# Patient Record
Sex: MALE | Race: WHITE | NOT HISPANIC OR LATINO | Employment: FULL TIME | URBAN - METROPOLITAN AREA
[De-identification: names, ages, dates, MRNs, and addresses within clinical notes are randomized per-mention and may not be internally consistent; named-entity substitution may affect disease eponyms.]

---

## 2018-01-04 ENCOUNTER — OFFICE VISIT CONVERTED (OUTPATIENT)
Dept: FAMILY MEDICINE CLINIC | Facility: CLINIC | Age: 48
End: 2018-01-04
Attending: PHYSICIAN ASSISTANT

## 2018-02-22 ENCOUNTER — OFFICE VISIT CONVERTED (OUTPATIENT)
Dept: FAMILY MEDICINE CLINIC | Facility: CLINIC | Age: 48
End: 2018-02-22
Attending: PHYSICIAN ASSISTANT

## 2018-02-22 ENCOUNTER — CONVERSION ENCOUNTER (OUTPATIENT)
Dept: FAMILY MEDICINE CLINIC | Facility: CLINIC | Age: 48
End: 2018-02-22

## 2018-10-15 ENCOUNTER — PROCEDURE VISIT CONVERTED (OUTPATIENT)
Dept: PODIATRY | Facility: CLINIC | Age: 48
End: 2018-10-15
Attending: PODIATRIST

## 2018-10-15 ENCOUNTER — CONVERSION ENCOUNTER (OUTPATIENT)
Dept: PODIATRY | Facility: CLINIC | Age: 48
End: 2018-10-15

## 2018-12-07 ENCOUNTER — OFFICE VISIT CONVERTED (OUTPATIENT)
Dept: FAMILY MEDICINE CLINIC | Facility: CLINIC | Age: 48
End: 2018-12-07
Attending: NURSE PRACTITIONER

## 2019-03-22 ENCOUNTER — HOSPITAL ENCOUNTER (OUTPATIENT)
Dept: LAB | Facility: HOSPITAL | Age: 49
Discharge: HOME | End: 2019-03-22
Attending: PHYSICIAN ASSISTANT

## 2019-03-22 ENCOUNTER — OFFICE VISIT CONVERTED (OUTPATIENT)
Dept: FAMILY MEDICINE CLINIC | Facility: CLINIC | Age: 49
End: 2019-03-22
Attending: PHYSICIAN ASSISTANT

## 2019-03-22 LAB
25(OH)D3 SERPL-MCNC: 32.4 NG/ML (ref 30–100)
ALBUMIN SERPL-MCNC: 4.5 G/DL (ref 3.5–5)
ALBUMIN/GLOB SERPL: 1.6 {RATIO} (ref 1.4–2.6)
ALP SERPL-CCNC: 78 U/L (ref 53–128)
ALT SERPL-CCNC: 53 U/L (ref 10–40)
ANION GAP SERPL CALC-SCNC: 21 MMOL/L (ref 8–19)
APPEARANCE UR: CLEAR
ASO AB SERPL-ACNC: 133 [IU]/ML (ref 0–200)
AST SERPL-CCNC: 33 U/L (ref 15–50)
BASOPHILS # BLD AUTO: 0.06 10*3/UL (ref 0–0.2)
BASOPHILS NFR BLD AUTO: 1.1 % (ref 0–3)
BILIRUB SERPL-MCNC: 0.31 MG/DL (ref 0.2–1.3)
BILIRUB UR QL: NEGATIVE
BUN SERPL-MCNC: 16 MG/DL (ref 5–25)
BUN/CREAT SERPL: 17 {RATIO} (ref 6–20)
CALCIUM SERPL-MCNC: 9.3 MG/DL (ref 8.7–10.4)
CHLORIDE SERPL-SCNC: 105 MMOL/L (ref 99–111)
CHOLEST SERPL-MCNC: 247 MG/DL (ref 107–200)
CHOLEST/HDLC SERPL: 7.1 {RATIO} (ref 3–6)
CK SERPL-CCNC: 179 U/L (ref 57–374)
COLOR UR: YELLOW
CONV ABS IMM GRAN: 0.01 10*3/UL (ref 0–0.2)
CONV CO2: 21 MMOL/L (ref 22–32)
CONV COLLECTION SOURCE (UA): NORMAL
CONV IMMATURE GRAN: 0.2 % (ref 0–1.8)
CONV RHEUMATOID FACTOR IGM: <10 [IU]/ML (ref 0–14)
CONV TOTAL PROTEIN: 7.3 G/DL (ref 6.3–8.2)
CONV UROBILINOGEN IN URINE BY AUTOMATED TEST STRIP: 0.2 {EHRLICHU}/DL (ref 0.1–1)
CREAT UR-MCNC: 0.95 MG/DL (ref 0.7–1.2)
CRP SERPL HS-MCNC: 0.17 MG/DL (ref 0–0.5)
DEPRECATED RDW RBC AUTO: 38.5 FL (ref 35.1–43.9)
EOSINOPHIL # BLD AUTO: 0.15 10*3/UL (ref 0–0.7)
EOSINOPHIL # BLD AUTO: 2.7 % (ref 0–7)
ERYTHROCYTE [DISTWIDTH] IN BLOOD BY AUTOMATED COUNT: 12 % (ref 11.6–14.4)
ERYTHROCYTE [SEDIMENTATION RATE] IN BLOOD: 7 MM/H (ref 0–20)
EST. AVERAGE GLUCOSE BLD GHB EST-MCNC: 111 MG/DL
FERRITIN SERPL-MCNC: 420 NG/ML (ref 30–300)
GFR SERPLBLD BASED ON 1.73 SQ M-ARVRAT: >60 ML/MIN/{1.73_M2}
GLOBULIN UR ELPH-MCNC: 2.8 G/DL (ref 2–3.5)
GLUCOSE SERPL-MCNC: 103 MG/DL (ref 70–99)
GLUCOSE UR QL: NEGATIVE MG/DL
HBA1C MFR BLD: 13.4 G/DL (ref 14–18)
HBA1C MFR BLD: 5.5 % (ref 3.5–5.7)
HCT VFR BLD AUTO: 39.8 % (ref 42–52)
HDLC SERPL-MCNC: 35 MG/DL (ref 40–60)
HGB UR QL STRIP: NEGATIVE
IRON SERPL-MCNC: 79 UG/DL (ref 70–180)
KETONES UR QL STRIP: NEGATIVE MG/DL
LDLC SERPL CALC-MCNC: 133 MG/DL (ref 70–100)
LEUKOCYTE ESTERASE UR QL STRIP: NEGATIVE
LYMPHOCYTES # BLD AUTO: 2.3 10*3/UL (ref 1–5)
MCH RBC QN AUTO: 29.3 PG (ref 27–31)
MCHC RBC AUTO-ENTMCNC: 33.7 G/DL (ref 33–37)
MCV RBC AUTO: 86.9 FL (ref 80–96)
MONOCYTES # BLD AUTO: 0.46 10*3/UL (ref 0.2–1.2)
MONOCYTES NFR BLD AUTO: 8.4 % (ref 3–10)
NEUTROPHILS # BLD AUTO: 2.51 10*3/UL (ref 2–8)
NEUTROPHILS NFR BLD AUTO: 45.7 % (ref 30–85)
NITRITE UR QL STRIP: NEGATIVE
NRBC CBCN: 0 % (ref 0–0.7)
OSMOLALITY SERPL CALC.SUM OF ELEC: 297 MOSM/KG (ref 273–304)
PH UR STRIP.AUTO: 6 [PH] (ref 5–8)
PLATELET # BLD AUTO: 242 10*3/UL (ref 130–400)
PMV BLD AUTO: 11 FL (ref 9.4–12.4)
POTASSIUM SERPL-SCNC: 4.2 MMOL/L (ref 3.5–5.3)
PROT UR QL: NEGATIVE MG/DL
PSA SERPL-MCNC: 0.9 NG/ML (ref 0–4)
RBC # BLD AUTO: 4.58 10*6/UL (ref 4.7–6.1)
SODIUM SERPL-SCNC: 143 MMOL/L (ref 135–147)
SP GR UR: 1.02 (ref 1–1.03)
T4 FREE SERPL-MCNC: 0.9 NG/DL (ref 0.9–1.8)
TESTOST SERPL-MCNC: 183 NG/DL (ref 249–836)
TRANSFERRIN SERPL-MCNC: 281 MG/DL (ref 215–365)
TRIGL SERPL-MCNC: 394 MG/DL (ref 40–150)
TSH SERPL-ACNC: 1.92 M[IU]/L (ref 0.27–4.2)
URATE SERPL-MCNC: 7.5 MG/DL (ref 3.5–8.5)
VARIANT LYMPHS NFR BLD MANUAL: 41.9 % (ref 20–45)
VLDLC SERPL-MCNC: 79 MG/DL (ref 5–37)
WBC # BLD AUTO: 5.49 10*3/UL (ref 4.8–10.8)

## 2019-03-25 LAB — CONV ANTI NUCLEAR ANTIBODY WITH REFLEX: NEGATIVE

## 2019-03-29 ENCOUNTER — HOSPITAL ENCOUNTER (OUTPATIENT)
Dept: CARDIOLOGY | Facility: HOSPITAL | Age: 49
Discharge: HOME | End: 2019-03-29
Attending: PHYSICIAN ASSISTANT

## 2019-04-01 ENCOUNTER — HOSPITAL ENCOUNTER (OUTPATIENT)
Dept: OTHER | Facility: HOSPITAL | Age: 49
Discharge: HOME | End: 2019-04-01
Attending: PHYSICIAN ASSISTANT

## 2019-04-01 LAB
FOLATE SERPL-MCNC: 15.4 NG/ML (ref 4.8–20)
FSH SERPL-ACNC: 5.7 M[IU]/ML
LH SERPL-ACNC: 2.8 M[IU]/ML
VIT B12 SERPL-MCNC: 371 PG/ML (ref 211–911)

## 2019-04-02 ENCOUNTER — OFFICE VISIT CONVERTED (OUTPATIENT)
Dept: FAMILY MEDICINE CLINIC | Facility: CLINIC | Age: 49
End: 2019-04-02
Attending: PHYSICIAN ASSISTANT

## 2019-04-05 LAB — CONV HEMOCHROMATOSIS MUTATION (C282Y,H63D,565C): NORMAL

## 2019-05-16 ENCOUNTER — OFFICE VISIT CONVERTED (OUTPATIENT)
Dept: FAMILY MEDICINE CLINIC | Facility: CLINIC | Age: 49
End: 2019-05-16
Attending: PHYSICIAN ASSISTANT

## 2019-05-16 ENCOUNTER — CONVERSION ENCOUNTER (OUTPATIENT)
Dept: FAMILY MEDICINE CLINIC | Facility: CLINIC | Age: 49
End: 2019-05-16

## 2019-07-09 ENCOUNTER — HOSPITAL ENCOUNTER (OUTPATIENT)
Dept: LAB | Facility: HOSPITAL | Age: 49
Discharge: HOME | End: 2019-07-09
Attending: PHYSICIAN ASSISTANT

## 2019-07-09 LAB
ALBUMIN SERPL-MCNC: 4.8 G/DL (ref 3.5–5)
ALBUMIN/GLOB SERPL: 1.8 {RATIO} (ref 1.4–2.6)
ALP SERPL-CCNC: 80 U/L (ref 53–128)
ALT SERPL-CCNC: 82 U/L (ref 10–40)
ANION GAP SERPL CALC-SCNC: 24 MMOL/L (ref 8–19)
AST SERPL-CCNC: 45 U/L (ref 15–50)
BASOPHILS # BLD AUTO: 0.06 10*3/UL (ref 0–0.2)
BASOPHILS NFR BLD AUTO: 0.8 % (ref 0–3)
BILIRUB SERPL-MCNC: 0.44 MG/DL (ref 0.2–1.3)
BUN SERPL-MCNC: 24 MG/DL (ref 5–25)
BUN/CREAT SERPL: 26 {RATIO} (ref 6–20)
CALCIUM SERPL-MCNC: 9.5 MG/DL (ref 8.7–10.4)
CHLORIDE SERPL-SCNC: 101 MMOL/L (ref 99–111)
CHOLEST SERPL-MCNC: 168 MG/DL (ref 107–200)
CHOLEST/HDLC SERPL: 4 {RATIO} (ref 3–6)
CONV ABS IMM GRAN: 0.03 10*3/UL (ref 0–0.2)
CONV CO2: 20 MMOL/L (ref 22–32)
CONV IMMATURE GRAN: 0.4 % (ref 0–1.8)
CONV TOTAL PROTEIN: 7.5 G/DL (ref 6.3–8.2)
CREAT UR-MCNC: 0.94 MG/DL (ref 0.7–1.2)
DEPRECATED RDW RBC AUTO: 39.3 FL (ref 35.1–43.9)
EOSINOPHIL # BLD AUTO: 0.14 10*3/UL (ref 0–0.7)
EOSINOPHIL # BLD AUTO: 1.9 % (ref 0–7)
ERYTHROCYTE [DISTWIDTH] IN BLOOD BY AUTOMATED COUNT: 12.3 % (ref 11.6–14.4)
GFR SERPLBLD BASED ON 1.73 SQ M-ARVRAT: >60 ML/MIN/{1.73_M2}
GLOBULIN UR ELPH-MCNC: 2.7 G/DL (ref 2–3.5)
GLUCOSE SERPL-MCNC: 87 MG/DL (ref 70–99)
HBA1C MFR BLD: 13.9 G/DL (ref 14–18)
HCT VFR BLD AUTO: 42.1 % (ref 42–52)
HDLC SERPL-MCNC: 42 MG/DL (ref 40–60)
LDLC SERPL CALC-MCNC: 81 MG/DL (ref 70–100)
LYMPHOCYTES # BLD AUTO: 2.3 10*3/UL (ref 1–5)
MCH RBC QN AUTO: 28.9 PG (ref 27–31)
MCHC RBC AUTO-ENTMCNC: 33 G/DL (ref 33–37)
MCV RBC AUTO: 87.5 FL (ref 80–96)
MONOCYTES # BLD AUTO: 0.56 10*3/UL (ref 0.2–1.2)
MONOCYTES NFR BLD AUTO: 7.6 % (ref 3–10)
NEUTROPHILS # BLD AUTO: 4.32 10*3/UL (ref 2–8)
NEUTROPHILS NFR BLD AUTO: 58.3 % (ref 30–85)
NRBC CBCN: 0 % (ref 0–0.7)
OSMOLALITY SERPL CALC.SUM OF ELEC: 295 MOSM/KG (ref 273–304)
PLATELET # BLD AUTO: 257 10*3/UL (ref 130–400)
PMV BLD AUTO: 10.6 FL (ref 9.4–12.4)
POTASSIUM SERPL-SCNC: 3.9 MMOL/L (ref 3.5–5.3)
PSA SERPL-MCNC: 2.27 NG/ML (ref 0–4)
RBC # BLD AUTO: 4.81 10*6/UL (ref 4.7–6.1)
SODIUM SERPL-SCNC: 141 MMOL/L (ref 135–147)
TESTOST SERPL-MCNC: 608 NG/DL (ref 249–836)
TRIGL SERPL-MCNC: 227 MG/DL (ref 40–150)
VARIANT LYMPHS NFR BLD MANUAL: 31 % (ref 20–45)
VLDLC SERPL-MCNC: 45 MG/DL (ref 5–37)
WBC # BLD AUTO: 7.41 10*3/UL (ref 4.8–10.8)

## 2019-07-17 ENCOUNTER — HOSPITAL ENCOUNTER (OUTPATIENT)
Dept: OTHER | Facility: HOSPITAL | Age: 49
Discharge: HOME | End: 2019-07-17
Attending: PHYSICIAN ASSISTANT

## 2019-07-17 LAB
ALBUMIN SERPL-MCNC: 4.5 G/DL (ref 3.5–5)
ALBUMIN/GLOB SERPL: 1.9 {RATIO} (ref 1.4–2.6)
ALP SERPL-CCNC: 74 U/L (ref 53–128)
ALT SERPL-CCNC: 54 U/L (ref 10–40)
ANION GAP SERPL CALC-SCNC: 16 MMOL/L (ref 8–19)
AST SERPL-CCNC: 29 U/L (ref 15–50)
BASOPHILS # BLD AUTO: 0.05 10*3/UL (ref 0–0.2)
BASOPHILS NFR BLD AUTO: 0.8 % (ref 0–3)
BILIRUB SERPL-MCNC: 0.33 MG/DL (ref 0.2–1.3)
BUN SERPL-MCNC: 19 MG/DL (ref 5–25)
BUN/CREAT SERPL: 21 {RATIO} (ref 6–20)
CALCIUM SERPL-MCNC: 9.3 MG/DL (ref 8.7–10.4)
CHLORIDE SERPL-SCNC: 105 MMOL/L (ref 99–111)
CHOLEST SERPL-MCNC: 124 MG/DL (ref 107–200)
CHOLEST/HDLC SERPL: 3.8 {RATIO} (ref 3–6)
CONV ABS IMM GRAN: 0.01 10*3/UL (ref 0–0.2)
CONV CO2: 25 MMOL/L (ref 22–32)
CONV IMMATURE GRAN: 0.2 % (ref 0–1.8)
CONV TOTAL PROTEIN: 6.9 G/DL (ref 6.3–8.2)
CREAT UR-MCNC: 0.9 MG/DL (ref 0.7–1.2)
DEPRECATED RDW RBC AUTO: 38.3 FL (ref 35.1–43.9)
EOSINOPHIL # BLD AUTO: 0.2 10*3/UL (ref 0–0.7)
EOSINOPHIL # BLD AUTO: 3.3 % (ref 0–7)
ERYTHROCYTE [DISTWIDTH] IN BLOOD BY AUTOMATED COUNT: 12.2 % (ref 11.6–14.4)
GFR SERPLBLD BASED ON 1.73 SQ M-ARVRAT: >60 ML/MIN/{1.73_M2}
GLOBULIN UR ELPH-MCNC: 2.4 G/DL (ref 2–3.5)
GLUCOSE SERPL-MCNC: 97 MG/DL (ref 70–99)
HBA1C MFR BLD: 14.3 G/DL (ref 14–18)
HCT VFR BLD AUTO: 41.7 % (ref 42–52)
HDLC SERPL-MCNC: 33 MG/DL (ref 40–60)
LDLC SERPL CALC-MCNC: 55 MG/DL (ref 70–100)
LYMPHOCYTES # BLD AUTO: 2.41 10*3/UL (ref 1–5)
MCH RBC QN AUTO: 29.6 PG (ref 27–31)
MCHC RBC AUTO-ENTMCNC: 34.3 G/DL (ref 33–37)
MCV RBC AUTO: 86.3 FL (ref 80–96)
MONOCYTES # BLD AUTO: 0.53 10*3/UL (ref 0.2–1.2)
MONOCYTES NFR BLD AUTO: 8.7 % (ref 3–10)
NEUTROPHILS # BLD AUTO: 2.88 10*3/UL (ref 2–8)
NEUTROPHILS NFR BLD AUTO: 47.4 % (ref 30–85)
NRBC CBCN: 0 % (ref 0–0.7)
OSMOLALITY SERPL CALC.SUM OF ELEC: 296 MOSM/KG (ref 273–304)
PLATELET # BLD AUTO: 253 10*3/UL (ref 130–400)
PMV BLD AUTO: 10.7 FL (ref 9.4–12.4)
POTASSIUM SERPL-SCNC: 3.8 MMOL/L (ref 3.5–5.3)
PSA SERPL-MCNC: 1.63 NG/ML (ref 0–4)
RBC # BLD AUTO: 4.83 10*6/UL (ref 4.7–6.1)
SODIUM SERPL-SCNC: 142 MMOL/L (ref 135–147)
TESTOST SERPL-MCNC: 382 NG/DL (ref 249–836)
TRIGL SERPL-MCNC: 179 MG/DL (ref 40–150)
VARIANT LYMPHS NFR BLD MANUAL: 39.6 % (ref 20–45)
VLDLC SERPL-MCNC: 36 MG/DL (ref 5–37)
WBC # BLD AUTO: 6.08 10*3/UL (ref 4.8–10.8)

## 2019-07-26 ENCOUNTER — HOSPITAL ENCOUNTER (OUTPATIENT)
Dept: GENERAL RADIOLOGY | Facility: HOSPITAL | Age: 49
Discharge: HOME | End: 2019-07-26
Attending: PHYSICIAN ASSISTANT

## 2019-07-31 ENCOUNTER — OFFICE VISIT CONVERTED (OUTPATIENT)
Dept: UROLOGY | Facility: CLINIC | Age: 49
End: 2019-07-31
Attending: UROLOGY

## 2019-08-05 ENCOUNTER — OFFICE VISIT CONVERTED (OUTPATIENT)
Dept: FAMILY MEDICINE CLINIC | Facility: CLINIC | Age: 49
End: 2019-08-05
Attending: PHYSICIAN ASSISTANT

## 2019-08-05 ENCOUNTER — CONVERSION ENCOUNTER (OUTPATIENT)
Dept: FAMILY MEDICINE CLINIC | Facility: CLINIC | Age: 49
End: 2019-08-05

## 2019-10-23 ENCOUNTER — CONVERSION ENCOUNTER (OUTPATIENT)
Dept: FAMILY MEDICINE CLINIC | Facility: CLINIC | Age: 49
End: 2019-10-23

## 2019-10-23 ENCOUNTER — OFFICE VISIT CONVERTED (OUTPATIENT)
Dept: FAMILY MEDICINE CLINIC | Facility: CLINIC | Age: 49
End: 2019-10-23
Attending: INTERNAL MEDICINE

## 2019-11-07 ENCOUNTER — HOSPITAL ENCOUNTER (OUTPATIENT)
Dept: LAB | Facility: HOSPITAL | Age: 49
Discharge: HOME | End: 2019-11-07
Attending: PHYSICIAN ASSISTANT

## 2019-11-07 LAB
ALBUMIN SERPL-MCNC: 4.6 G/DL (ref 3.5–5)
ALBUMIN/GLOB SERPL: 1.8 {RATIO} (ref 1.4–2.6)
ALP SERPL-CCNC: 82 U/L (ref 53–128)
ALT SERPL-CCNC: 60 U/L (ref 10–40)
ANION GAP SERPL CALC-SCNC: 21 MMOL/L (ref 8–19)
AST SERPL-CCNC: 38 U/L (ref 15–50)
BASOPHILS # BLD AUTO: 0.07 10*3/UL (ref 0–0.2)
BASOPHILS NFR BLD AUTO: 0.9 % (ref 0–3)
BILIRUB SERPL-MCNC: 0.25 MG/DL (ref 0.2–1.3)
BUN SERPL-MCNC: 14 MG/DL (ref 5–25)
BUN/CREAT SERPL: 15 {RATIO} (ref 6–20)
CALCIUM SERPL-MCNC: 9.7 MG/DL (ref 8.7–10.4)
CHLORIDE SERPL-SCNC: 104 MMOL/L (ref 99–111)
CONV ABS IMM GRAN: 0.02 10*3/UL (ref 0–0.2)
CONV CO2: 21 MMOL/L (ref 22–32)
CONV IMMATURE GRAN: 0.3 % (ref 0–1.8)
CONV TOTAL PROTEIN: 7.2 G/DL (ref 6.3–8.2)
CREAT UR-MCNC: 0.94 MG/DL (ref 0.7–1.2)
DEPRECATED RDW RBC AUTO: 38.5 FL (ref 35.1–43.9)
EOSINOPHIL # BLD AUTO: 0.15 10*3/UL (ref 0–0.7)
EOSINOPHIL # BLD AUTO: 2 % (ref 0–7)
ERYTHROCYTE [DISTWIDTH] IN BLOOD BY AUTOMATED COUNT: 12.2 % (ref 11.6–14.4)
GFR SERPLBLD BASED ON 1.73 SQ M-ARVRAT: >60 ML/MIN/{1.73_M2}
GLOBULIN UR ELPH-MCNC: 2.6 G/DL (ref 2–3.5)
GLUCOSE SERPL-MCNC: 94 MG/DL (ref 70–99)
HCT VFR BLD AUTO: 43.1 % (ref 42–52)
HGB BLD-MCNC: 14.6 G/DL (ref 14–18)
LYMPHOCYTES # BLD AUTO: 2.63 10*3/UL (ref 1–5)
LYMPHOCYTES NFR BLD AUTO: 34.2 % (ref 20–45)
MCH RBC QN AUTO: 29.4 PG (ref 27–31)
MCHC RBC AUTO-ENTMCNC: 33.9 G/DL (ref 33–37)
MCV RBC AUTO: 86.9 FL (ref 80–96)
MONOCYTES # BLD AUTO: 0.58 10*3/UL (ref 0.2–1.2)
MONOCYTES NFR BLD AUTO: 7.5 % (ref 3–10)
NEUTROPHILS # BLD AUTO: 4.24 10*3/UL (ref 2–8)
NEUTROPHILS NFR BLD AUTO: 55.1 % (ref 30–85)
NRBC CBCN: 0 % (ref 0–0.7)
OSMOLALITY SERPL CALC.SUM OF ELEC: 294 MOSM/KG (ref 273–304)
PLATELET # BLD AUTO: 238 10*3/UL (ref 130–400)
PMV BLD AUTO: 11 FL (ref 9.4–12.4)
POTASSIUM SERPL-SCNC: 3.8 MMOL/L (ref 3.5–5.3)
PSA SERPL-MCNC: 1.39 NG/ML (ref 0–4)
RBC # BLD AUTO: 4.96 10*6/UL (ref 4.7–6.1)
SODIUM SERPL-SCNC: 142 MMOL/L (ref 135–147)
TESTOST SERPL-MCNC: 212 NG/DL (ref 193–740)
WBC # BLD AUTO: 7.69 10*3/UL (ref 4.8–10.8)

## 2020-02-06 ENCOUNTER — OFFICE VISIT CONVERTED (OUTPATIENT)
Dept: FAMILY MEDICINE CLINIC | Facility: CLINIC | Age: 50
End: 2020-02-06
Attending: PHYSICIAN ASSISTANT

## 2020-02-29 ENCOUNTER — HOSPITAL ENCOUNTER (OUTPATIENT)
Dept: OTHER | Facility: HOSPITAL | Age: 50
Discharge: HOME | End: 2020-02-29
Attending: PHYSICIAN ASSISTANT

## 2020-02-29 LAB — TESTOST SERPL-MCNC: 604 NG/DL (ref 193–740)

## 2020-04-16 ENCOUNTER — TELEMEDICINE CONVERTED (OUTPATIENT)
Dept: FAMILY MEDICINE CLINIC | Facility: CLINIC | Age: 50
End: 2020-04-16
Attending: PHYSICIAN ASSISTANT

## 2020-04-17 ENCOUNTER — HOSPITAL ENCOUNTER (OUTPATIENT)
Dept: URGENT CARE | Facility: CLINIC | Age: 50
Discharge: HOME | End: 2020-04-17

## 2020-05-18 ENCOUNTER — TELEMEDICINE CONVERTED (OUTPATIENT)
Dept: FAMILY MEDICINE CLINIC | Facility: CLINIC | Age: 50
End: 2020-05-18
Attending: PHYSICIAN ASSISTANT

## 2020-05-19 ENCOUNTER — HOSPITAL ENCOUNTER (OUTPATIENT)
Dept: LAB | Facility: HOSPITAL | Age: 50
Discharge: HOME | End: 2020-05-19
Attending: PHYSICIAN ASSISTANT

## 2020-05-19 LAB
25(OH)D3 SERPL-MCNC: 40.4 NG/ML (ref 30–100)
APPEARANCE UR: CLEAR
BILIRUB UR QL: ABNORMAL
COLOR UR: ABNORMAL
CONV BACTERIA: NEGATIVE
CONV COLLECTION SOURCE (UA): ABNORMAL
CONV UROBILINOGEN IN URINE BY AUTOMATED TEST STRIP: 1 {EHRLICHU}/DL (ref 0.1–1)
GLUCOSE UR QL: NEGATIVE MG/DL
HGB UR QL STRIP: NEGATIVE
KETONES UR QL STRIP: ABNORMAL MG/DL
LEUKOCYTE ESTERASE UR QL STRIP: ABNORMAL
NITRITE UR QL STRIP: NEGATIVE
PH UR STRIP.AUTO: 7 [PH] (ref 5–8)
PROT UR QL: NEGATIVE MG/DL
PSA SERPL-MCNC: 1.59 NG/ML (ref 0–4)
RBC #/AREA URNS HPF: ABNORMAL /[HPF]
SP GR UR: 1.02 (ref 1–1.03)
T4 FREE SERPL-MCNC: 1 NG/DL (ref 0.9–1.8)
TESTOST SERPL-MCNC: 479 NG/DL (ref 193–740)
TSH SERPL-ACNC: 1.03 M[IU]/L (ref 0.27–4.2)
WBC #/AREA URNS HPF: ABNORMAL /[HPF]

## 2020-05-20 LAB
AMYLASE SERPL-CCNC: 33 U/L (ref 30–110)
CHOLEST SERPL-MCNC: 127 MG/DL (ref 107–200)
CHOLEST/HDLC SERPL: 3.3 {RATIO} (ref 3–6)
GGT SERPL-CCNC: 29 U/L (ref 8–78)
HDLC SERPL-MCNC: 38 MG/DL (ref 40–60)
LDLC SERPL CALC-MCNC: 71 MG/DL (ref 70–100)
LIPASE SERPL-CCNC: 95 U/L (ref 5–51)
TRIGL SERPL-MCNC: 90 MG/DL (ref 40–150)
VLDLC SERPL-MCNC: 18 MG/DL (ref 5–37)

## 2020-06-11 ENCOUNTER — HOSPITAL ENCOUNTER (OUTPATIENT)
Dept: GENERAL RADIOLOGY | Facility: HOSPITAL | Age: 50
Discharge: HOME | End: 2020-06-11
Attending: PHYSICIAN ASSISTANT

## 2020-06-23 ENCOUNTER — CONVERSION ENCOUNTER (OUTPATIENT)
Dept: GASTROENTEROLOGY | Facility: CLINIC | Age: 50
End: 2020-06-23

## 2020-06-23 ENCOUNTER — OFFICE VISIT CONVERTED (OUTPATIENT)
Dept: GASTROENTEROLOGY | Facility: CLINIC | Age: 50
End: 2020-06-23
Attending: NURSE PRACTITIONER

## 2020-06-27 ENCOUNTER — HOSPITAL ENCOUNTER (OUTPATIENT)
Dept: OTHER | Facility: HOSPITAL | Age: 50
Discharge: HOME | End: 2020-06-27
Attending: NURSE PRACTITIONER

## 2020-06-27 LAB
ALBUMIN SERPL-MCNC: 4.5 G/DL (ref 3.5–5)
ALBUMIN/GLOB SERPL: 1.9 {RATIO} (ref 1.4–2.6)
ALP SERPL-CCNC: 73 U/L (ref 53–128)
ALT SERPL-CCNC: 37 U/L (ref 10–40)
ANION GAP SERPL CALC-SCNC: 15 MMOL/L (ref 8–19)
AST SERPL-CCNC: 23 U/L (ref 15–50)
BASOPHILS # BLD AUTO: 0.05 10*3/UL (ref 0–0.2)
BASOPHILS NFR BLD AUTO: 1 % (ref 0–3)
BILIRUB SERPL-MCNC: 0.51 MG/DL (ref 0.2–1.3)
BUN SERPL-MCNC: 12 MG/DL (ref 5–25)
BUN/CREAT SERPL: 16 {RATIO} (ref 6–20)
CALCIUM SERPL-MCNC: 9.2 MG/DL (ref 8.7–10.4)
CHLORIDE SERPL-SCNC: 106 MMOL/L (ref 99–111)
CONV ABS IMM GRAN: 0.01 10*3/UL (ref 0–0.2)
CONV CO2: 23 MMOL/L (ref 22–32)
CONV IMMATURE GRAN: 0.2 % (ref 0–1.8)
CONV TOTAL PROTEIN: 6.9 G/DL (ref 6.3–8.2)
CREAT UR-MCNC: 0.76 MG/DL (ref 0.7–1.2)
DEPRECATED RDW RBC AUTO: 39.1 FL (ref 35.1–43.9)
EOSINOPHIL # BLD AUTO: 0.14 10*3/UL (ref 0–0.7)
EOSINOPHIL # BLD AUTO: 2.8 % (ref 0–7)
ERYTHROCYTE [DISTWIDTH] IN BLOOD BY AUTOMATED COUNT: 12.4 % (ref 11.6–14.4)
FERRITIN SERPL-MCNC: 218 NG/ML (ref 30–300)
GFR SERPLBLD BASED ON 1.73 SQ M-ARVRAT: >60 ML/MIN/{1.73_M2}
GLOBULIN UR ELPH-MCNC: 2.4 G/DL (ref 2–3.5)
GLUCOSE SERPL-MCNC: 103 MG/DL (ref 70–99)
HCT VFR BLD AUTO: 43.1 % (ref 42–52)
HGB BLD-MCNC: 14.8 G/DL (ref 14–18)
INR PPP: 0.96 (ref 2–3)
IRON SATN MFR SERPL: 19 % (ref 20–55)
IRON SERPL-MCNC: 71 UG/DL (ref 70–180)
LYMPHOCYTES # BLD AUTO: 1.73 10*3/UL (ref 1–5)
LYMPHOCYTES NFR BLD AUTO: 34.2 % (ref 20–45)
MCH RBC QN AUTO: 29.7 PG (ref 27–31)
MCHC RBC AUTO-ENTMCNC: 34.3 G/DL (ref 33–37)
MCV RBC AUTO: 86.5 FL (ref 80–96)
MONOCYTES # BLD AUTO: 0.34 10*3/UL (ref 0.2–1.2)
MONOCYTES NFR BLD AUTO: 6.7 % (ref 3–10)
NEUTROPHILS # BLD AUTO: 2.79 10*3/UL (ref 2–8)
NEUTROPHILS NFR BLD AUTO: 55.1 % (ref 30–85)
NRBC CBCN: 0 % (ref 0–0.7)
OSMOLALITY SERPL CALC.SUM OF ELEC: 290 MOSM/KG (ref 273–304)
PLATELET # BLD AUTO: 215 10*3/UL (ref 130–400)
PMV BLD AUTO: 10.4 FL (ref 9.4–12.4)
POTASSIUM SERPL-SCNC: 4 MMOL/L (ref 3.5–5.3)
PROTHROMBIN TIME: 10.4 S (ref 9.4–12)
RBC # BLD AUTO: 4.98 10*6/UL (ref 4.7–6.1)
SODIUM SERPL-SCNC: 140 MMOL/L (ref 135–147)
TIBC SERPL-MCNC: 368 UG/DL (ref 245–450)
TRANSFERRIN SERPL-MCNC: 257 MG/DL (ref 215–365)
WBC # BLD AUTO: 5.06 10*3/UL (ref 4.8–10.8)

## 2020-06-28 LAB
DSDNA AB SER-ACNC: NEGATIVE [IU]/ML
ENA AB SER IA-ACNC: NEGATIVE {RATIO}

## 2020-06-29 LAB — BACTERIA SPEC AEROBE CULT: NORMAL

## 2020-06-30 LAB
CONV HEPATITIS B SURFACE AG W CONFIRMATION RE: NEGATIVE
CONV IMMUNOGLOBULIN G (IGG): 823 MG/DL (ref 603–1613)
CONV IMMUNOGLOBULIN M (IGM): 93 MG/DL (ref 20–172)
DEPRECATED MITOCHONDRIA M2 IGG SER-ACNC: <20 UNITS (ref 0–20)
HAV IGM SERPL QL IA: NEGATIVE
HBV CORE IGM SERPL QL IA: NEGATIVE
HCV AB SER DONR QL: <0.1 S/CO RATIO (ref 0–0.9)
IGA SERPL-MCNC: 151 MG/DL (ref 90–386)
PROT PATTERN SERPL IFE-IMP: NORMAL

## 2020-07-01 LAB — SMOOTH MUSCLE F-ACTIN AB IGG: 6 UNITS (ref 0–19)

## 2020-07-02 LAB
A1AT SERPL-MCNC: 131 MG/DL (ref 101–187)
CONV NASH FIBROSURE: NORMAL
PHENOTYPE: NORMAL

## 2020-07-06 LAB — ELASTASE PANC STL-MCNT: 384 UG ELAST./G

## 2020-07-31 ENCOUNTER — HOSPITAL ENCOUNTER (OUTPATIENT)
Dept: GASTROENTEROLOGY | Facility: HOSPITAL | Age: 50
Discharge: HOME | End: 2020-07-31
Attending: NURSE PRACTITIONER

## 2020-08-21 ENCOUNTER — HOSPITAL ENCOUNTER (OUTPATIENT)
Dept: PREADMISSION TESTING | Facility: HOSPITAL | Age: 50
Discharge: HOME | End: 2020-08-21
Attending: INTERNAL MEDICINE

## 2020-08-22 LAB — SARS-COV-2 RNA SPEC QL NAA+PROBE: NOT DETECTED

## 2020-08-25 ENCOUNTER — HOSPITAL ENCOUNTER (OUTPATIENT)
Dept: GASTROENTEROLOGY | Facility: HOSPITAL | Age: 50
Setting detail: HOSPITAL OUTPATIENT SURGERY
Discharge: HOME | End: 2020-08-25
Attending: INTERNAL MEDICINE

## 2020-09-08 ENCOUNTER — HOSPITAL ENCOUNTER (OUTPATIENT)
Dept: URGENT CARE | Facility: CLINIC | Age: 50
Discharge: HOME | End: 2020-09-08
Attending: EMERGENCY MEDICINE

## 2020-10-05 ENCOUNTER — PROCEDURE VISIT CONVERTED (OUTPATIENT)
Dept: GASTROENTEROLOGY | Facility: CLINIC | Age: 50
End: 2020-10-05
Attending: NURSE PRACTITIONER

## 2020-10-28 ENCOUNTER — HOSPITAL ENCOUNTER (OUTPATIENT)
Dept: LAB | Facility: HOSPITAL | Age: 50
Discharge: HOME | End: 2020-10-28
Attending: NURSE PRACTITIONER

## 2020-10-28 ENCOUNTER — OFFICE VISIT CONVERTED (OUTPATIENT)
Dept: GASTROENTEROLOGY | Facility: CLINIC | Age: 50
End: 2020-10-28
Attending: NURSE PRACTITIONER

## 2020-10-29 LAB
IRON SATN MFR SERPL: 25 % (ref 20–55)
IRON SERPL-MCNC: 95 UG/DL (ref 70–180)
TIBC SERPL-MCNC: 383 UG/DL (ref 245–450)
TRANSFERRIN SERPL-MCNC: 268 MG/DL (ref 215–365)

## 2020-11-12 ENCOUNTER — OFFICE VISIT CONVERTED (OUTPATIENT)
Dept: ORTHOPEDIC SURGERY | Facility: CLINIC | Age: 50
End: 2020-11-12
Attending: ORTHOPAEDIC SURGERY

## 2021-01-21 ENCOUNTER — OFFICE VISIT CONVERTED (OUTPATIENT)
Dept: ORTHOPEDIC SURGERY | Facility: CLINIC | Age: 51
End: 2021-01-21
Attending: ORTHOPAEDIC SURGERY

## 2021-04-01 ENCOUNTER — HOSPITAL ENCOUNTER (OUTPATIENT)
Dept: VACCINE CLINIC | Facility: HOSPITAL | Age: 51
Discharge: HOME | End: 2021-04-01
Attending: INTERNAL MEDICINE

## 2021-04-15 ENCOUNTER — OFFICE VISIT CONVERTED (OUTPATIENT)
Dept: ORTHOPEDIC SURGERY | Facility: CLINIC | Age: 51
End: 2021-04-15
Attending: ORTHOPAEDIC SURGERY

## 2021-04-23 ENCOUNTER — HOSPITAL ENCOUNTER (OUTPATIENT)
Dept: VACCINE CLINIC | Facility: HOSPITAL | Age: 51
Discharge: HOME | End: 2021-04-23
Attending: INTERNAL MEDICINE

## 2021-04-29 ENCOUNTER — HOSPITAL ENCOUNTER (OUTPATIENT)
Dept: GENERAL RADIOLOGY | Facility: HOSPITAL | Age: 51
Discharge: HOME | End: 2021-04-29
Attending: ORTHOPAEDIC SURGERY

## 2021-05-06 ENCOUNTER — OFFICE VISIT CONVERTED (OUTPATIENT)
Dept: ORTHOPEDIC SURGERY | Facility: CLINIC | Age: 51
End: 2021-05-06
Attending: ORTHOPAEDIC SURGERY

## 2021-05-10 NOTE — H&P
History and Physical      Patient Name: Charles Nina   Patient ID: [id number]   Sex: Male   YOB: 1970    Primary Care Provider: Thor Leal PA-C   Referring Provider: Thor Leal PA-C    Visit Date: June 23, 2020    Provider: DANNI Moya   Location: [facility]   Location Address: [address] [place] [address], [address]  [place], [place]  [id number]   Location Phone: [phone]          Chief Complaint  · Abdominal pain      History Of Present Illness  The patient is a 49 year old /White male who presents on referral from Thor Leal PA-C for a gastroenterology evaluation.      Admits to drinking alcohol for many years. 1 bottle of wine daily and occasional beer. Admits to a few unprofessional tattoos. Denies iv/intranasal drug use or hx of blood transfusion.     Abdominal cramping present on and off, nothing makes it better or worse. Bowel movement 1-4x daily, loose stool. Denies hematochezia, melena, or family hx of colon cancer. He has since switched to a gluten free diet and he has notice a decrease in diarrhea but not nausea. Nauseated in the mornings, without vomiting. States he has had a weight loss of 40lb in the last 2 month.     CT of the abdomen and pelvis with contrast 6/11/2020: No acute abdominal or pelvic abnormalities identified.  Stable fluid density in the right iliopsoas, which may be secondary to iliopsoas bursitis.    EGD 3/24/2016 (Dr. Gamez): Small Hiatal Hernia  Colonoscopy 3/24/2016: 2 small 3 to 4 mm sessile polyps in the ascending colon which were removed with a cold snare in 1 piece.  Duodenum biopsyno significant pathologic change.  Ascending colon polyp nascent serrated polyp.       Past Medical History  Allergy; Asthma; Carpal tunnel syndrome of right wrist; Foot pain, left; Hyperlipidemia; Left elbow pain; Low testosterone in male; Lumbago/low back pain; Narcolepsy; Neuroma; Plantar fascial fibromatosis of right foot; Radiculopathy,  "lumbosacral; Reflux Disease; Seasonal allergies; Sinus trouble         Past Surgical History  Arm Surgery; Carpal Tunnel Release; Colonoscopy, screening; EGD; Jaw Surgery         Medication List  Allegra Allergy 180 mg oral tablet; atorvastatin 20 mg oral tablet; Depo-Testosterone 200 mg/mL intramuscular oil; needle (disp) 18 G 18 gauge x 1\" miscellaneous needle; Nexium 20 mg oral capsule,delayed release(DR/EC); Syringe 3cc/58Mp0-5/2\" 3 mL 21 gauge x 1 1/2\" miscellaneous syringe         Allergy List  Neurontin       Allergies Reconciled  Family Medical History  Diabetes, unspecified type; Diabetes, unspecified type; Rheumatoid arthritis; Prostate cancer         Social History  Active but no formal exercise; Alcohol (Light); ; No known infection risk; Tobacco (Former)         Immunizations  Name Date Admin   Tdap          Review of Systems  · Constitutional  o Denies  o : chills, fever  · Eyes  o Denies  o : blurred vision, changes in vision  · Cardiovascular  o Denies  o : chest pain, syncope  · Respiratory  o Denies  o : shortness of breath, dry cough  · Gastrointestinal  o Admits  o : See HPI  · Genitourinary  o Denies  o : dysuria, blood in urine  · Integument  o Denies  o : rash, new skin lesions  · Neurologic  o Denies  o : altered mental status, tingling or numbness  · Musculoskeletal  o Denies  o : joint pain, limitation of motion  · Endocrine  o Admits  o : weight loss  o Denies  o : weight gain  · Psychiatric  o Denies  o : anxiety, depression      Vitals  Date Time BP Position Site L\R Cuff Size HR RR TEMP (F) WT  HT  BMI kg/m2 BSA m2 O2 Sat        06/23/2020 11:22 /86 Sitting      98.5 228lbs 8oz 6'   30.99 2.29           Physical Examination  · Constitutional  o Appearance  o : well developed, well-nourished, in no acute distress  · Eyes  o Vision  o :   § Visual Fields  § : eyes move symmetrical in all directions  o Sclerae  o : anicteric  o Pupils and Irises  o : pupils equal and " symmetrical  · Neck  o Inspection/Palpation  o : supple  · Respiratory  o Respiratory Effort  o : breathing unlabored  o Inspection of Chest  o : normal appearance, no retractions  o Auscultation of Lungs  o : clear to auscultation bilaterally  · Cardiovascular  o Heart  o :   § Auscultation of Heart  § : no murmurs, gallops or rubs  · Gastrointestinal  o Abdominal Examination  o : diffuse abdominal tenderness to palpation present, normal bowel sounds, tone normal without rigidity or guarding, no masses present, abdomen scaphoid upon supine  o Digital Rectal Exam  o : deferred  · Lymphatic  o Neck  o : no palpable lymphadenopathy  · Skin and Subcutaneous Tissue  o General Inspection  o : without focal lesions; turgor is normal  · Psychiatric  o General  o : Alert and oriented x3  o Mood and Affect  o : Mood and affect are appropriate to circumstances              Assessment  · Generalized abdominal pain     789.07/R10.84  · Diarrhea     787.91/R19.7  · Fatty liver     571.8/K76.0  · Alcohol abuse, daily use     305.01/F10.10    Problems Reconciled  Plan  · Orders  o C difficile Toxigenic Assay (PCR) [facility] (09344) - - 06/23/2020  o Stool culture and sensitivity (86438) - - 06/23/2020  o Giardia and Cryptosporidium Enzyme Immunoassay [facility] (02536, 64167) - - 06/23/2020  o Lactoferrin (Fecal) Qualitative (40882) - - 06/23/2020  o Pancreatic elastase stool (83135) - - 06/23/2020  o CBC with Auto Diff [facility] (10589) - - 06/23/2020  o CMP [facility] (76179) - - 06/23/2020  o Fibroscan Liver Elastography (38432) - - 06/23/2020  o Iron + transferrin (TIBC, calculated % saturation) (17501) - - 06/23/2020  o Ferritin ser/plas (20531) - - 06/23/2020  o SUMAN (antinuclear antibody profile) by enzyme immunoassay (65230) - - 06/23/2020  o Anti-mitochondrial antibody assay (32195) - - 06/23/2020  o Anti-Smooth Muscle Antibody (ASMA) [facility] (86296) - - 06/23/2020  o Serum immunofixation electrophoresis (49600) - -  06/23/2020  o Alpha-1-Antitrypsin/Phenotype [facility] (35399, 77585) - - 06/23/2020  o PT (83479) - - 06/23/2020  o Acute hepatitis panel (HAV IgM, HbcAb IgM, HbsAg, HCV) (17618, 53458, 09042, 60197, 88108) - - 06/23/2020  o DAVIES Fibrosure [facility] (drawn at [facility] only and must be fasting 8 hours; requires HT and WT) (63471, 35609, 43526, 36403, 45700, 75264, 34020, 46061, 66354, 97976) - - 06/23/2020  · Medications  o Medications have been Reconciled  o Transition of Care or Provider Policy  · Instructions  o Information given on current diagnoses.  o Lifestyle modifications discussed.  o Electronically Identified Patient Education Materials Provided Electronically  · Disposition  o 3 month f/u            Electronically Signed by: DANNI Moya -Author on June 23, 2020 12:58:02 PM

## 2021-05-10 NOTE — PROCEDURES
Procedure Note      Patient Name: Charles Nina   Patient ID: [id number]   Sex: Male   YOB: 1970    Primary Care Provider: Thor Leal PA-C   Referring Provider: Thor Leal PA-C    Visit Date: October 5, 2020    Provider: DANNI Mohan   Location: [facility]   Location Address: [address] [place] [address], [address] [place] [address]  [id number]   Location Phone: [phone]          Charles Nina presents today for Capsule Endoscopy Procedure for anemia. He successfully swallowed capsule without difficulty or complications.           Assessment  · H/O endoscopy     V45.89/Z98.890  · Anemia due to blood loss     280.0/D50.0      Plan  · Orders  o Capsule endoscopy esophagus through ileum [facility] (35145) - - 10/05/2020  · Instructions  o Please see Capsule endoscopy report scanned into patient record.             Electronically Signed by: Sheree Parnell, -Author on October 5, 2020 08:37:19 AM

## 2021-05-10 NOTE — H&P
"   History and Physical      Patient Name: Charles Nina   Patient ID: [id number]   Sex: Male   YOB: 1970    Primary Care Provider: Tohr Leal PA-C   Referring Provider: Thor Leal PA-C    Visit Date: November 12, 2020    Provider: Teri Gaffney MD   Location: [facility]   Location Address: [address] [place] [address]  [id number]   Location Phone: [phone]          Chief Complaint  · Right Shoulder Pain      History Of Present Illness  Charles Nina is a 50 year old /White male who presents today to [facility]. Patient is here for right shoulder pain that has been bothering him for greater than a year. Patient states at the anterior portion of the shoulder, radiates down to his right elbow. Patient states has increasing pain with work, as he works at [employer]. Patient had a MRI on 6/11/2020.       Past Medical History  Allergy; Asthma; Carpal tunnel syndrome of right wrist; Foot pain, left; Hyperlipidemia; Left elbow pain; Low testosterone in male; Lumbago/low back pain; Narcolepsy; Neuroma; Plantar fascial fibromatosis of right foot; Radiculopathy, lumbosacral; Reflux Disease; Seasonal allergies; Sinus trouble         Past Surgical History  Arm Surgery; Carpal Tunnel Release; Colonoscopy, screening; EGD; Jaw Surgery         Medication List  Allegra Allergy 180 mg oral tablet; atorvastatin 20 mg oral tablet; Depo-Testosterone 200 mg/mL intramuscular oil; needle (disp) 18 G 18 gauge x 1\" miscellaneous needle; Protonix 40 mg oral tablet,delayed release (DR/EC); Syringe 3cc/86Wn2-4/2\" 3 mL 21 gauge x 1 1/2\" miscellaneous syringe; Voltaren 1 % topical gel         Allergy List  Neurontin         Family Medical History  Diabetes, unspecified type; Diabetes, unspecified type; Rheumatoid arthritis; Prostate Cancer         Social History  Active but no formal exercise; Alcohol (Light); ; No known infection risk; Tobacco (Former)         Review of " Systems  · Constitutional  o Denies  o : fever, chills, weight loss  · Cardiovascular  o Denies  o : chest pain, shortness of breath  · Gastrointestinal  o Denies  o : liver disease, heartburn, nausea, blood in stools  · Genitourinary  o Denies  o : painful urination, blood in urine  · Integument  o Denies  o : rash, itching  · Neurologic  o Denies  o : headache, weakness, loss of consciousness  · Musculoskeletal  o Denies  o : painful, swollen joints  · Psychiatric  o Denies  o : drug/alcohol addiction, anxiety, depression      Vitals  Date Time BP Position Site L\R Cuff Size HR RR TEMP (F) WT  HT  BMI kg/m2 BSA m2 O2 Sat FR L/min FiO2        11/12/2020 09:35 AM      64 - R   205lbs 16oz 6'   27.94 2.18 97 %            Physical Examination  · Constitutional  o Appearance  o : well developed, well-nourished, no obvious deformities present  · Head and Face  o Head  o :   § Inspection  § : normocephalic  o Face  o :   § Inspection  § : no facial lesions  · Eyes  o Conjunctivae  o : conjunctivae normal  o Sclerae  o : sclerae white  · Ears, Nose, Mouth and Throat  o Ears  o :   § External Ears  § : appearance within normal limits  § Hearing  § : intact  o Nose  o :   § External Nose  § : appearance normal  · Neck  o Inspection/Palpation  o : normal appearance  o Range of Motion  o : full range of motion  · Respiratory  o Respiratory Effort  o : breathing unlabored  o Inspection of Chest  o : normal appearance  o Auscultation of Lungs  o : no audible wheezing or rales  · Cardiovascular  o Heart  o : regular rate  · Gastrointestinal  o Abdominal Examination  o : soft and non-tender  · Skin and Subcutaneous Tissue  o General Inspection  o : intact, no rashes  · Psychiatric  o General  o : Alert and oriented x3  o Judgement and Insight  o : judgment and insight intact  o Mood and Affect  o : mood normal, affect appropriate  · Right Shoulder  o Inspection  o : No skin discoloration, atrophy or swelling. Palpable  tenderness over the AC joint. Palpable tenderness over the subacromial bursa. He has full range of motion. Pain with empty can. Pain with cross body adduction. Pain with internal rotation to L4. Neurovascularly and sensation grossly intact. X-rays show AC joint osteoarthritis, osteophyte formation.  · Injection Note/Aspiration Note  o Site  o : right shoulder  o Procedure  o : Procedure: After educating the patient, patient gave consent for procedure. After using Chloraprep, the joint space was injected. The patient tolerated the procedure well.   o Medication  o : 80 mg of DepoMedrol with 9cc of 1% Lidocaine  · In Office Procedures  o View  o : AP/LATERAL  o Site  o : right, scapula  o Indication  o : Right Shoulder Pain  o Study  o : X-rays ordered, taken in the office, and reviewed today.  o Xray  o : X-rays show AC joint osteoarthritis, osteophyte formation.  o Comparative Data  o : No comparative data found              Assessment  · Primary osteoarthritis of right AC joint.     715.11/M19.019  · Right shoulder bursitis     727.3/M71.9  · Right shoulder pain, unspecified chronicity     719.41/M25.511  · Right rotator cuff tendinitis     726.90/M77.9  · Shoulder impingement syndrome, right     726.2/M75.41      Plan  · Orders  o Depo-Medrol injection 80mg () - - 11/12/2020   Lot [id number] Exp 09 2021 [employer] Administered by EVA Gaffney MD  o Shoulder Intra-articular Injection without US Guidance [facility] (33111) - - 11/12/2020   Lot [id number] Exp 08 01 2021 [employer] Administered by EVA Gaffney MD  o Scapula (Right) [facility] Preferred View (79886-YW) - 719.41/M25.511 - 11/12/2020  · Medications  o Medications have been Reconciled  o Transition of Care or Provider Policy  · Instructions  o Reviewed the patient's Past Medical, Social, and Family history as well as the ROS at today's visit, no changes.  o Call or return if worsening symptoms.  o This note is transcribed by Gay alves/fiona  o We discussed  conservative versus surgical management. We discussed risks and benefits of surgery.             Electronically Signed by: Gay Rojas, -Author on November 13, 2020 10:31:01 AM  Electronically Co-signed by: Guadalupe Aquino PA-C -Reviewer on November 13, 2020 01:15:55 PM  Electronically Co-signed by: Teri Gaffney MD -Reviewer on November 14, 2020 09:59:58 AM

## 2021-05-12 ENCOUNTER — HOSPITAL ENCOUNTER (OUTPATIENT)
Dept: PERIOP | Facility: HOSPITAL | Age: 51
Setting detail: HOSPITAL OUTPATIENT SURGERY
Discharge: HOME | End: 2021-05-12
Attending: ORTHOPAEDIC SURGERY

## 2021-05-12 NOTE — PROGRESS NOTES
Progress Note      Patient Name: Charles Nina   Patient ID: [id number]   Sex: Male   YOB: 1970    Primary Care Provider: Thor Leal PA-C   Referring Provider: Thor Leal PA-C    Visit Date: April 16, 2020    Provider: Thor Leal PA-C   Location: [facility]   Location Address: [address] [place] [address], [address]  [place], [place]  [id number]   Location Phone: [phone]          Chief Complaint  · anxiety      History Of Present Illness  Video Conferencing Visit  Charles Nina is a 49 year old /White male who is presenting for evaluation via video conferencing. Verbal consent obtained before beginning visit.   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 He states he needs something to help him.  He scored a 5 on his depression today and states it is all from the COVID 19 and work.    Pt is wanting a note for work.    He has a PMH of asthma    Pt works in a factory.           Past Medical History  Disease Name Date Onset Notes   Allergy --  --    Asthma --  --    Carpal tunnel syndrome of right wrist 02/27/2015 --    Foot pain, left --  --    Hyperlipidemia 04/02/2019 --    Left elbow pain --  --    Low testosterone in male 05/16/2019 --    Lumbago/low back pain --  --    Narcolepsy 02/27/2015 --    Neuroma 10/15/2018 --    Plantar fascial fibromatosis of right foot 10/15/2018 --    Radiculopathy, lumbosacral 06/17/2016 --    Reflux Disease --  --    Seasonal allergies 02/27/2015 --    Sinus trouble --  --          Past Surgical History  Procedure Name Date Notes   Arm Surgery 1991 --    Carpal Tunnel Release --  --    Colonoscopy, screening 03/24/2016  "EGD/Colon: Hiatal Hernia/Colon Polyps (Dr. Gamez)   EGD 03/24/2016 Hiatal Hernia (Dr. Gamez)   Jaw Surgery 1991 --          Medication List  Name Date Started Instructions   Allegra Allergy 180 mg oral tablet  take 1 tablet (180 mg) by oral route once daily   atorvastatin 20 mg oral tablet 02/06/2020 TAKE 1 TABLET(20 MG) BY MOUTH EVERY DAY   Depo-Testosterone 200 mg/mL intramuscular oil 04/13/2020 inject 1 milliliter by intramuscular route every 2 weeks for 90 days   needle (disp) 18 G 18 gauge x 1\" miscellaneous needle 05/16/2019 use as directed   Nexium 20 mg oral capsule,delayed release(/EC)  take 1 capsule by oral route QD (otc)   Syringe 3cc/73Mh6-9/2\" 3 mL 21 gauge x 1 1/2\" miscellaneous syringe 05/16/2019 use as directed         Allergy List  Allergen Name Date Reaction Notes   Neurontin --  nausea --        Allergies Reconciled  Family Medical History  Disease Name Relative/Age Notes   Diabetes, unspecified type Father/   grandparents   Diabetes, unspecified type  --    Rheumatoid arthritis  --    Prostate cancer Uncle/   --          Social History  Finding Status Start/Stop Quantity Notes   Active but no formal exercise --  --/-- --  --    Alcohol Light --/-- --  02/22/2018 - 01/04/2018 -     --  --/-- --  --    No known infection risk --  --/-- --  --    Tobacco Former 17/38 2-3 ppd --          Immunizations  NameDate Admin Mfg Trade Name Lot Number Route Inj VIS Given VIS Publication   Tdap09/12/2016 [employer] [id number] IM RD 09/12/2016 01/24/2012   Comments: tolerated well         Review of Systems  · Constitutional  o Denies  o : fever, fatigue, weight loss, weight gain  · Cardiovascular  o Denies  o : pedal edema, claudication, chest pressure, palpitations  · Respiratory  o Denies  o : shortness of breath, wheezing, cough, hemoptysis, dyspnea on exertion  · Gastrointestinal  o Denies  o : nausea, vomiting, diarrhea, constipation, abdominal pain      Physical " Examination  · Constitutional  o Appearance  o : well-nourished, no acute distress  · Respiratory  o Respiratory Effort  o : breathing comfortably, no cough  · Skin and Subcutaneous Tissue  o General Inspection  o : no visible rash, no lesions  · Neurologic  o Mental Status Examination  o :   § Orientation  § : grossly oriented to person, place and time, no facial droop          Assessment  · Anxiety disorder     300.00/F41.9  · Asthma     493.90/J45.909  · Screening for depression     V79.0/Z13.89    Problems Reconciled  Plan  · Orders  o ACO-18: Positive screen for clinical depression using a standardized tool and a follow-up plan documented () - V79.0/Z13.89 - 04/16/2020  o ACO-39: Current medications updated and reviewed () - - 04/16/2020  · Medications  o Medications have been Reconciled  o Transition of Care or Provider Policy  · Instructions  o Depression Screen completed and scanned into the EMR under the designated folder within the patient's documents.  o Today's PHQ-9 result is __5_  o Take all medications as prescribed/directed.  o Patient was educated/instructed on their diagnosis, treatment and medications prior to discharge from the clinic today.  o Bring all medicines with their bottles to each office visit.  o Discussed Covid-19 precautions including, but not limited to, social distancing, avoid touching your face, and hand washing.   o Electronically Identified Patient Education Materials Provided Electronically  · Disposition  o Call or Return if symptoms worsen or persist.  o F/U in clinic in 1 month.  o Care Transition            Electronically Signed by: Thor Leal PA-C -Author on April 16, 2020 05:18:49 PM

## 2021-05-13 NOTE — PROGRESS NOTES
Progress Note      Patient Name: Charles Nina   Patient ID: [id number]   Sex: Male   YOB: 1970    Primary Care Provider: Thor Leal PA-C   Referring Provider: Thor Leal PA-C    Visit Date: May 18, 2020    Provider: Thor Leal PA-C   Location: [facility]   Location Address: [address] [place] [address], [address]  [place], [place]  [id number]   Location Phone: [phone]          Chief Complaint  · Abdominal Pain  · Diarrhea      History Of Present Illness  Video Conferencing Visit  Charles Nina is a 49 year old /White male who is presenting for evaluation via video conferencing. Verbal consent obtained before beginning visit.   The following staff were present during this visit: VASILE Cisneros   Charles Nina is a 49 year old /White male who presents for evaluation and treatment of:      abdominal pain and diarrhea    Symptoms existent for about 2 weeks    Intermittent diarrhea (at least once daily).  No dark stools/blood noticed    Associated with diffuse abdominal pain and nausea    Denies fever or vomiting    No changes noted in medications or diet.    Pt has been drinking on a regular basis.  He drinks a bottle of wine a day, this past Saturday night he drank 6-8 beers and a bottle of wine.  Pt does not think it is an issue.  He has been drinking since 18 yo.  He has been in rehab in the past.  He had a DUI at 19.      Pt is having right shoulder pain, he states that it has not improved and wanting MRI of shoulder ordered also.    Weight loss unexplained.           Past Medical History  Disease Name Date Onset Notes   Allergy --  --    Asthma --  --    Carpal tunnel syndrome of right wrist 02/27/2015 --    Foot pain, left --  --    Hyperlipidemia 04/02/2019 --    Left elbow pain --  --    Low testosterone in male 05/16/2019 --    Lumbago/low back pain --  --    Narcolepsy 02/27/2015 --    Neuroma 10/15/2018 --    Plantar fascial fibromatosis of right foot 10/15/2018  "--    Radiculopathy, lumbosacral 06/17/2016 --    Reflux Disease --  --    Seasonal allergies 02/27/2015 --    Sinus trouble --  --          Past Surgical History  Procedure Name Date Notes   Arm Surgery 1991 --    Carpal Tunnel Release --  --    Colonoscopy, screening 03/24/2016 EGD/Colon: Hiatal Hernia/Colon Polyps (Dr. Gamez)   EGD 03/24/2016 Hiatal Hernia (Dr. Gamez)   Jaw Surgery 1991 --          Medication List  Name Date Started Instructions   Allegra Allergy 180 mg oral tablet  take 1 tablet (180 mg) by oral route once daily   atorvastatin 20 mg oral tablet 02/06/2020 TAKE 1 TABLET(20 MG) BY MOUTH EVERY DAY   Depo-Testosterone 200 mg/mL intramuscular oil 04/13/2020 inject 1 milliliter by intramuscular route every 2 weeks for 90 days   needle (disp) 18 G 18 gauge x 1\" miscellaneous needle 05/16/2019 use as directed   Nexium 20 mg oral capsule,delayed release(/EC)  take 1 capsule by oral route QD (otc)   Syringe 3cc/58Xz0-9/2\" 3 mL 21 gauge x 1 1/2\" miscellaneous syringe 05/16/2019 use as directed         Allergy List  Allergen Name Date Reaction Notes   Neurontin --  nausea --          Family Medical History  Disease Name Relative/Age Notes   Diabetes, unspecified type Father/   grandparents   Diabetes, unspecified type  --    Rheumatoid arthritis  --    Prostate cancer Uncle/   --          Social History  Finding Status Start/Stop Quantity Notes   Active but no formal exercise --  --/-- --  --    Alcohol Light --/-- --  02/22/2018 - 01/04/2018 -     --  --/-- --  --    No known infection risk --  --/-- --  --    Tobacco Former 17/38 2-3 ppd --          Immunizations  NameDate Admin Mfg Trade Name Lot Number Route Inj VIS Given VIS Publication   Tdap09/12/2016 SK[employer]RI[employer] [id number] IM RD 09/12/2016 01/24/2012   Comments: tolerated well         Review of Systems  · Constitutional  o Admits  o : weight loss  o Denies  o : fever, fatigue, weight gain  · Cardiovascular  o Denies  o : lower extremity " edema, claudication, chest pressure, palpitations  · Respiratory  o Denies  o : shortness of breath, wheezing, cough, hemoptysis, dyspnea on exertion  · Gastrointestinal  o Admits  o : diarrhea  o Denies  o : nausea, vomiting, constipation, abdominal pain      Physical Examination  · Constitutional  o Appearance  o : well-nourished, no acute distress  · Respiratory  o Respiratory Effort  o : breathing comfortably, no cough  · Skin and Subcutaneous Tissue  o General Inspection  o : no visible rash, no lesions  · Neurologic  o Mental Status Examination  o :   § Orientation  § : grossly oriented to person, place and time, no facial droop          Assessment  · Abdominal pain     789.00/R10.9  · Alcohol abuse     305.00/F10.10  · Diarrhea     787.91/R19.7  · Nausea     787.02/R11.0  · Right shoulder pain     719.41/M25.511      Plan  · Orders  o Abdomen and Pelvis (CT) (with contrast) (36834) - 789.00/R10.9 - 05/19/2020  o Brief face-to-face behavioral counseling for alcohol misuse, 15 minutes () - 305.00/F10.10 - 05/19/2020  o CBC with Auto Diff [facility] (57375) - - 05/18/2020  o CMP [facility] (25294) - - 05/18/2020  o Lipid Panel [facility] (61464) - - 05/18/2020  o Thyroid Profile (43694, 45795, THYII) - - 05/18/2020  o Urinalysis with Reflex Microscopy if abnormal ([facility]) (10347) - - 05/18/2020  o Vitamin D (25-Hydroxy) Level (71053) - - 05/18/2020  o ACO-39: Current medications updated and reviewed () - - 05/18/2020  o GGT (72617) - - 05/18/2020  o Testosterone (Total) (52213) - - 05/18/2020  o PSA ultrasensitive DIAGNOSTIC [facility] (42223) - - 05/18/2020  o Amylase/Lipase Profile (ALPN) - - 05/18/2020  o MRI shoulder right wo contrast (87149) - - 05/18/2020  · Medications  o Medications have been Reconciled  o Transition of Care or Provider Policy  · Instructions  o Counseled patient on harms of alcohol misuse and encouraged cessation of alcohol intake (15 minutes).  o Local AA (Alcoholics Anonymous) information provided to  patient/family.   o Take all medications as prescribed/directed.  o Patient instructed/educated on their diet and exercise program.  o Patient was educated/instructed on their diagnosis, treatment and medications prior to discharge from the clinic today.  o Patient counseled to reduce calorie intake.  o Patient was instructed to exercise regularly.  o Discussed Covid-19 precautions including, but not limited to, social distancing, avoid touching your face, and hand washing.   · Disposition  o Call or Return if symptoms worsen or persist.  o F/U in 3 months.  o Care Transition            Electronically Signed by: Thor Leal PA-C -Author on May 19, 2020 06:51:19 AM

## 2021-05-13 NOTE — PROGRESS NOTES
Progress Note      Patient Name: Charles Nina   Patient ID: [id number]   Sex: Male   YOB: 1970    Primary Care Provider: Thor Leal PA-C   Referring Provider: Thor Leal PA-C    Visit Date: October 28, 2020    Provider: DANNI Moya   Location: [facility]   Location Address: [address] [place] [address], [address]  [place], [place]  [id number]   Location Phone: [phone]          Chief Complaint  · Follow up of EGD/Colonoscopy      History Of Present Illness     Liver work-up consistent with stage III steatosis and stage II fibrosis. Negative for any autoimmune or infectious disease. Patient states he is still drinking alcohol several times weekly, mainly wine.  Denies any ascites, confusion, or extremity swelling.    He feels that heartburn is controlled with Nexium 20 mg daily however complaining of nausea that waxes and wanes all day without vomiting.  Feels the nausea can be debilitating at times and he has missed several days of work.  Decreased appetite with a 30+ pound weight loss in the last year.  Denies early satiety.    Bowel movement 2-3x daily, formed stool. Denies any hematochezia or melena.     Capsule endoscopy 10/5/2020: Possible gastric polyp.  Petechiae in the duodenum.    EGD 8/25/2020: Hiatal hernia.  Normal mucosa in the whole esophagus.  3 to 5 mm polyp in the fundus and stomach body.  Normal mucosa in the duodenum and whole stomach.  Colonoscopy 8/25/2020: Normal mucosa in the terminal ileum.  5 mm polyp in the sigmoid colon.  Sigmoid colon polyp biopsyhyperplastic polyp.  Duodenum biopsyno significant pathologic change.  Antrum biopsyfocal intestinal metaplasia.  Gastric polyp biopsyfundic gland polyp.  Repeat colonoscopy in 5 years    CT of the abdomen and pelvis with contrast 6/11/2020: No acute abdominal or pelvic abnormalities identified.  Stable fluid density in the right iliopsoas, which may be secondary to iliopsoas bursitis.      "      Past Medical History  Allergy; Asthma; Carpal tunnel syndrome of right wrist; Foot pain, left; Hyperlipidemia; Left elbow pain; Low testosterone in male; Lumbago/low back pain; Narcolepsy; Neuroma; Plantar fascial fibromatosis of right foot; Radiculopathy, lumbosacral; Reflux Disease; Seasonal allergies; Sinus trouble         Past Surgical History  Arm Surgery; Carpal Tunnel Release; Colonoscopy, screening; EGD; Jaw Surgery         Medication List  Allegra Allergy 180 mg oral tablet; atorvastatin 20 mg oral tablet; Depo-Testosterone 200 mg/mL intramuscular oil; needle (disp) 18 G 18 gauge x 1\" miscellaneous needle; Syringe 3cc/71Lx5-7/2\" 3 mL 21 gauge x 1 1/2\" miscellaneous syringe         Allergy List  Neurontin       Allergies Reconciled  Family Medical History  Diabetes, unspecified type; Diabetes, unspecified type; Rheumatoid arthritis; Prostate cancer         Social History  Active but no formal exercise; Alcohol (Light); ; No known infection risk; Tobacco (Former)         Immunizations  Name Date Admin   Tdap 09/12/2016         Review of Systems  · Constitutional  o Denies  o : chills, fever  · Cardiovascular  o Denies  o : chest pain, dyspnea on exertion  · Respiratory  o Denies  o : cough, shortness of breath  · Gastrointestinal  o Admits  o : see HPI   · Endocrine  o Admits  o : weight loss  o Denies  o : weight gain      Vitals  Date Time BP Position Site L\R Cuff Size HR RR TEMP (F) WT  HT  BMI kg/m2 BSA m2 O2 Sat FR L/min FiO2 HC       10/28/2020 02:41 /59 Sitting    68 - R   205lbs 16oz 6'   27.94 2.18             Physical Examination  · Constitutional  o Appearance  o : Healthy-appearing, awake and alert in no acute distress  · Head and Face  o Head  o : Normocephalic with no worriesome skin lesions  · Eyes  o Vision  o :   § Visual Fields  § : eyes move symmetrical in all directions  o Sclerae  o : sclerae anicteric  o Pupils and Irises  o : pupils equal and " symmetrical  · Neck  o Inspection/Palpation  o : Trachea is midline, no adenopathy  · Respiratory  o Respiratory Effort  o : Breathing is unlabored.  o Inspection of Chest  o : normal appearance  o Auscultation of Lungs  o : Chest is clear to auscultation bilaterally.  · Cardiovascular  o Heart  o :   § Auscultation of Heart  § : no murmurs, rubs, or gallops  o Peripheral Vascular System  o :   § Extremities  § : no cyanosis, clubbing or edema;   · Gastrointestinal  o Abdominal Examination  o : left-upper quadrant tenderness to palpation present, normal bowel sounds, tone normal without rigidity or guarding, no masses present, abdomen scaphoid upon supine  o Digital Rectal Exam  o : deferred  · Skin and Subcutaneous Tissue  o General Inspection  o : without focal lesions; turgor is normal  · Psychiatric  o General  o : Alert and oriented x3  o Mood and Affect  o : Mood and affect are appropriate to circumstances          Assessment  · Fatty liver     571.8/K76.0  · Gastritis, Other specified     535.40  · Hernia, Hiatal     553.3/K44.9  · Liver fibrosis     571.5/K74.0  · Nausea     787.02/R11.0  · Iron deficiency     280.9/E61.1  · Colon polyp     211.3/K63.5      Plan  · Orders  o Iron Profile (Iron 16168 TIBC 40811 and Transferrin 72447) (IRONP) - - 10/28/2020  · Medications  o Protonix 40 mg oral tablet,delayed release (DR/EC)   SIG: take 1 tablet (40 mg) by oral route once daily for 30 days   DISP: (30) Tablet with 4 refills  Prescribed on 10/28/2020     o Nexium 20 mg oral capsule,delayed release(DR/EC)   SIG: take 1 capsule by oral route QD (otc)   DISP: (0) capsule with 0 refills  Discontinued on 10/28/2020     o Medications have been Reconciled  o Transition of Care or Provider Policy  · Instructions  o Information given on current diagnoses.  o Lifestyle modifications discussed.  o Discussed risks of long-term PPI use.  o Electronically Identified Patient Education Materials Provided  Electronically  · Disposition  o 5 month f/u            Electronically Signed by: DANNI Moya -Author on October 28, 2020 03:34:19 PM

## 2021-05-14 VITALS — OXYGEN SATURATION: 98 % | HEART RATE: 68 BPM | WEIGHT: 213 LBS | BODY MASS INDEX: 28.85 KG/M2 | HEIGHT: 72 IN

## 2021-05-14 VITALS
SYSTOLIC BLOOD PRESSURE: 124 MMHG | HEART RATE: 68 BPM | DIASTOLIC BLOOD PRESSURE: 59 MMHG | BODY MASS INDEX: 27.9 KG/M2 | WEIGHT: 206 LBS | HEIGHT: 72 IN

## 2021-05-14 VITALS — HEART RATE: 64 BPM | OXYGEN SATURATION: 97 % | WEIGHT: 206 LBS | HEIGHT: 72 IN | BODY MASS INDEX: 27.9 KG/M2

## 2021-05-14 VITALS — WEIGHT: 208.12 LBS | OXYGEN SATURATION: 97 % | HEIGHT: 72 IN | BODY MASS INDEX: 28.19 KG/M2 | HEART RATE: 74 BPM

## 2021-05-14 NOTE — PROGRESS NOTES
"   Progress Note      Patient Name: Charles Nina   Patient ID: [id number]   Sex: Male   YOB: 1970    Primary Care Provider: Thor Leal PA-C   Referring Provider: Thor Leal PA-C    Visit Date: April 15, 2021    Provider: Teri Gaffney MD   Location: [facility]   Location Address: [address] [place] [address]  [id number]   Location Phone: [phone]          Chief Complaint  · Right Shoulder Pain      History Of Present Illness  Charles Nina is a 50 year old /White male who presents today to [facility]. Patient is here for evaluation of his right shoulder. He has made some mild improvements with shots in the past. It is getting to the point where he thinks he is going to need surgery on it. He has been having increasing pain lately.       Past Medical History  Allergy; Asthma; Carpal tunnel syndrome of right wrist; Foot pain, left; Hyperlipemia; Hyperlipidemia; Left elbow pain; Low testosterone in male; Lumbago/low back pain; Narcolepsy; Neuroma; Plantar fascial fibromatosis of right foot; Radiculopathy, lumbosacral; Reflux Disease; Seasonal allergies; Sinus trouble         Past Surgical History  Arm Surgery; Carpal Tunnel Release; Colonoscopy; Colonoscopy, screening; EGD; Jaw Surgery; Metal implants         Medication List  Allegra Allergy 180 mg oral tablet; Depo-Testosterone 200 mg/mL intramuscular oil; needle (disp) 18 G 18 gauge x 1\" miscellaneous needle; Syringe 3cc/36Mj6-8/2\" 3 mL 21 gauge x 1 1/2\" miscellaneous syringe; Voltaren 1 % topical gel         Allergy List  Neurontin         Family Medical History  Diabetes, unspecified type; Diabetes, unspecified type; Rheumatoid arthritis; Prostate cancer; Family history of Arthritis         Social History  Active but no formal exercise; Alcohol (Light); Alcohol Use (Never); lives with children; lives with spouse; ; .; No known infection risk; Recreational Drug Use (Former); Tobacco (Former); " Working         Review of Systems  · Constitutional  o Denies  o : fever, chills, weight loss  · Cardiovascular  o Denies  o : chest pain, shortness of breath  · Gastrointestinal  o Denies  o : liver disease, heartburn, nausea, blood in stools  · Genitourinary  o Denies  o : painful urination, blood in urine  · Integument  o Denies  o : rash, itching  · Neurologic  o Denies  o : headache, weakness, loss of consciousness  · Musculoskeletal  o Admits  o : painful, swollen joints  · Psychiatric  o Denies  o : drug/alcohol addiction, anxiety, depression      Vitals  Date Time BP Position Site L\R Cuff Size HR RR TEMP (F) WT  HT  BMI kg/m2 BSA m2 O2 Sat FR L/min FiO2 HC       04/15/2021 03:58 PM      68 - R   212lbs 16oz 6'   28.89 2.22 98 %            Physical Examination  · Constitutional  o Appearance  o : well developed, well-nourished, no obvious deformities present  · Head and Face  o Head  o :   § Inspection  § : normocephalic  o Face  o :   § Inspection  § : no facial lesions  · Eyes  o Conjunctivae  o : conjunctivae normal  o Sclerae  o : sclerae white  · Ears, Nose, Mouth and Throat  o Ears  o :   § External Ears  § : appearance within normal limits  § Hearing  § : intact  o Nose  o :   § External Nose  § : appearance normal  · Neck  o Inspection/Palpation  o : normal appearance  o Range of Motion  o : full range of motion  · Respiratory  o Respiratory Effort  o : breathing unlabored  o Inspection of Chest  o : normal appearance  o Auscultation of Lungs  o : no audible wheezing or rales  · Cardiovascular  o Heart  o : regular rate  · Gastrointestinal  o Abdominal Examination  o : soft and non-tender  · Skin and Subcutaneous Tissue  o General Inspection  o : intact, no rashes  · Psychiatric  o General  o : Alert and oriented x3  o Judgement and Insight  o : judgment and insight intact  o Mood and Affect  o : mood normal, affect appropriate  · Right Shoulder  o Inspection  o : Mild weakness. Sensation intact.  Pulse is normal. He has decreased range of motion. Positive impingement test.           Assessment  · Right shoulder pain, unspecified chronicity     719.41/M25.511  · Shoulder pain, right     719.41/M25.511      Plan  · Medications  o Medications have been Reconciled  o Transition of Care or Provider Policy  · Instructions  o Reviewed the patient's Past Medical, Social, and Family history as well as the ROS at today's visit, no changes.  o Call or return if worsening symptoms.  o This note is transcribed by Gay Rojas /fiona  o He needs a MRI to check his rotator cuff before we plan surgery. See him back after the MRI for the results.             Electronically Signed by: Gay Rojas, -Author on April 21, 2021 09:01:15 AM  Electronically Co-signed by: Teri Gaffney MD -Reviewer on April 21, 2021 09:59:23 PM

## 2021-05-14 NOTE — PROGRESS NOTES
"   Progress Note      Patient Name: Charles Nina   Patient ID: [id number]   Sex: Male   YOB: 1970    Primary Care Provider: Thor Leal PA-C   Referring Provider: Thor Leal PA-C    Visit Date: January 21, 2021    Provider: Teri Gaffney MD   Location: [facility]   Location Address: [address] [place] [address]  [id number]   Location Phone: [phone]          Chief Complaint  · Right Shoulder Pain      History Of Present Illness  Charles Nina is a 50 year old /White male who presents today to [facility]. Patient is following-up for right shoulder pain. Patient states increase of pain. Patient states steroid injections did provide relief of pain for 6 weeks. Patient states increase of pain in the anterior portion of her shoulder, radiates down to his right elbow and into his shoulder blade. Patient denies any recent injury or trauma.       Past Medical History  Allergy; Asthma; Carpal tunnel syndrome of right wrist; Foot pain, left; Hyperlipemia; Hyperlipidemia; Left elbow pain; Low testosterone in male; Lumbago/low back pain; Narcolepsy; Neuroma; Plantar fascial fibromatosis of right foot; Radiculopathy, lumbosacral; Reflux Disease; Seasonal allergies; Sinus trouble         Past Surgical History  Arm Surgery; Carpal Tunnel Release; Colonoscopy; Colonoscopy, screening; EGD; Jaw Surgery; Metal implants         Medication List  Allegra Allergy 180 mg oral tablet; Depo-Testosterone 200 mg/mL intramuscular oil; needle (disp) 18 G 18 gauge x 1\" miscellaneous needle; Syringe 3cc/38Sl4-9/2\" 3 mL 21 gauge x 1 1/2\" miscellaneous syringe; Voltaren 1 % topical gel         Allergy List  Neurontin         Family Medical History  Diabetes, unspecified type; Diabetes, unspecified type; Rheumatoid arthritis; Prostate cancer; Family history of Arthritis         Social History  Active but no formal exercise; Alcohol (Light); Alcohol Use (Never); lives with children; lives with " spouse; ; .; No known infection risk; Recreational Drug Use (Former); Tobacco (Former); Working         Review of Systems  · Constitutional  o Denies  o : fever, chills, weight loss  · Cardiovascular  o Denies  o : chest pain, shortness of breath  · Gastrointestinal  o Denies  o : liver disease, heartburn, nausea, blood in stools  · Genitourinary  o Denies  o : painful urination, blood in urine  · Integument  o Denies  o : rash, itching  · Neurologic  o Denies  o : headache, weakness, loss of consciousness  · Musculoskeletal  o Admits  o : painful, swollen joints  · Psychiatric  o Denies  o : drug/alcohol addiction, anxiety, depression      Vitals  Date Time BP Position Site L\R Cuff Size HR RR TEMP (F) WT  HT  BMI kg/m2 BSA m2 O2 Sat FR L/min FiO2 HC       01/21/2021 01:49 PM      74 - R   208lbs 2oz 6'   28.23 2.19 97 %            Physical Examination  · Constitutional  o Appearance  o : well developed, well-nourished, no obvious deformities present  · Head and Face  o Head  o :   § Inspection  § : normocephalic  o Face  o :   § Inspection  § : no facial lesions  · Eyes  o Conjunctivae  o : conjunctivae normal  o Sclerae  o : sclerae white  · Ears, Nose, Mouth and Throat  o Ears  o :   § External Ears  § : appearance within normal limits  § Hearing  § : intact  o Nose  o :   § External Nose  § : appearance normal  · Neck  o Inspection/Palpation  o : normal appearance  o Range of Motion  o : full range of motion  · Respiratory  o Respiratory Effort  o : breathing unlabored  o Inspection of Chest  o : normal appearance  o Auscultation of Lungs  o : no audible wheezing or rales  · Cardiovascular  o Heart  o : regular rate  · Gastrointestinal  o Abdominal Examination  o : soft and non-tender  · Skin and Subcutaneous Tissue  o General Inspection  o : intact, no rashes  · Psychiatric  o General  o : Alert and oriented x3  o Judgement and Insight  o : judgment and insight intact  o Mood and Affect  o : mood  normal, affect appropriate  · Right Shoulder  o Inspection  o : No skin discoloration, atrophy or swelling. Palpable tenderness over the AC joint. Palpable tenderness over the subacromial bursa. He has full range of motion. Pain with internal rotation. Pain with cross-body adduction. Pain with Neers. Pain with Arreola. Neurovascularly and sensation grossly intact.   · Injection Note/Aspiration Note  o Site  o : right shoulder  o Procedure  o : Procedure: After educating the patient, patient gave consent for procedure. After using Chloraprep, the joint space was injected. The patient tolerated the procedure well.   o Medication  o : 80 mg of DepoMedrol with 9cc of 1% Lidocaine          Assessment  · Primary osteoarthritis of right shoulder     715.11/M19.019  · Right shoulder pain, unspecified chronicity     719.41/M25.511  · Right shoulder impingement     726.2/M75.40      Plan  · Orders  o Depo-Medrol injection 80mg () - - 01/21/2021   Lot [id number] Exp 01 2022 [employer] Administered by EVA Gaffney MD  o Shoulder Intra-articular Injection without US Guidance [facility] (90895) - - 01/21/2021   Lot [id number] Exp 03 01 2022 [employer] Administered by EVA Gaffney MD  · Medications  o Medications have been Reconciled  o Transition of Care or Provider Policy  · Instructions  o Reviewed the patient's Past Medical, Social, and Family history as well as the ROS at today's visit, no changes.  o Call or return if worsening symptoms.  o This note is transcribed by Gay alves/fiona  o Steroid injection. Follow-up as needed.             Electronically Signed by: Gay Rojas, -Author on January 22, 2021 11:29:49 AM  Electronically Co-signed by: Guadalupe Aquino PA-C -Reviewer on January 22, 2021 12:27:57 PM  Electronically Co-signed by: Teri Gaffney MD -Reviewer on January 22, 2021 06:43:38 PM

## 2021-05-15 VITALS
OXYGEN SATURATION: 98 % | SYSTOLIC BLOOD PRESSURE: 127 MMHG | HEART RATE: 83 BPM | DIASTOLIC BLOOD PRESSURE: 77 MMHG | BODY MASS INDEX: 34.55 KG/M2 | WEIGHT: 255.12 LBS | HEIGHT: 72 IN

## 2021-05-15 VITALS
OXYGEN SATURATION: 97 % | HEIGHT: 72 IN | SYSTOLIC BLOOD PRESSURE: 122 MMHG | WEIGHT: 257.25 LBS | HEART RATE: 62 BPM | DIASTOLIC BLOOD PRESSURE: 74 MMHG | BODY MASS INDEX: 34.84 KG/M2

## 2021-05-15 VITALS
BODY MASS INDEX: 34.72 KG/M2 | DIASTOLIC BLOOD PRESSURE: 71 MMHG | SYSTOLIC BLOOD PRESSURE: 133 MMHG | HEART RATE: 61 BPM | HEIGHT: 72 IN | WEIGHT: 256.37 LBS | OXYGEN SATURATION: 97 %

## 2021-05-15 VITALS
SYSTOLIC BLOOD PRESSURE: 118 MMHG | HEIGHT: 72 IN | DIASTOLIC BLOOD PRESSURE: 72 MMHG | OXYGEN SATURATION: 95 % | HEART RATE: 62 BPM | WEIGHT: 247.25 LBS | BODY MASS INDEX: 33.49 KG/M2

## 2021-05-15 VITALS
HEIGHT: 72 IN | SYSTOLIC BLOOD PRESSURE: 112 MMHG | TEMPERATURE: 98.5 F | WEIGHT: 228.5 LBS | DIASTOLIC BLOOD PRESSURE: 86 MMHG | BODY MASS INDEX: 30.95 KG/M2

## 2021-05-15 VITALS — WEIGHT: 253 LBS | HEIGHT: 72 IN | BODY MASS INDEX: 34.27 KG/M2 | RESPIRATION RATE: 15 BRPM

## 2021-05-15 VITALS
BODY MASS INDEX: 35.13 KG/M2 | DIASTOLIC BLOOD PRESSURE: 64 MMHG | HEART RATE: 63 BPM | SYSTOLIC BLOOD PRESSURE: 123 MMHG | WEIGHT: 259.37 LBS | OXYGEN SATURATION: 98 % | HEIGHT: 72 IN

## 2021-05-15 VITALS
WEIGHT: 248.25 LBS | HEIGHT: 72 IN | DIASTOLIC BLOOD PRESSURE: 72 MMHG | OXYGEN SATURATION: 100 % | TEMPERATURE: 98.3 F | SYSTOLIC BLOOD PRESSURE: 117 MMHG | HEART RATE: 65 BPM | BODY MASS INDEX: 33.62 KG/M2

## 2021-05-16 VITALS
BODY MASS INDEX: 33.76 KG/M2 | TEMPERATURE: 97.1 F | HEIGHT: 72 IN | SYSTOLIC BLOOD PRESSURE: 126 MMHG | DIASTOLIC BLOOD PRESSURE: 77 MMHG | WEIGHT: 249.25 LBS | OXYGEN SATURATION: 99 % | HEART RATE: 70 BPM

## 2021-05-16 VITALS
OXYGEN SATURATION: 97 % | WEIGHT: 232 LBS | SYSTOLIC BLOOD PRESSURE: 114 MMHG | DIASTOLIC BLOOD PRESSURE: 57 MMHG | HEIGHT: 72 IN | BODY MASS INDEX: 31.42 KG/M2 | HEART RATE: 72 BPM

## 2021-05-16 VITALS
DIASTOLIC BLOOD PRESSURE: 75 MMHG | SYSTOLIC BLOOD PRESSURE: 137 MMHG | HEART RATE: 69 BPM | BODY MASS INDEX: 32.51 KG/M2 | WEIGHT: 240 LBS | HEIGHT: 72 IN | OXYGEN SATURATION: 99 %

## 2021-05-16 VITALS
BODY MASS INDEX: 30.95 KG/M2 | HEART RATE: 60 BPM | SYSTOLIC BLOOD PRESSURE: 116 MMHG | OXYGEN SATURATION: 98 % | WEIGHT: 228.5 LBS | RESPIRATION RATE: 12 BRPM | DIASTOLIC BLOOD PRESSURE: 70 MMHG | HEIGHT: 72 IN

## 2021-05-25 ENCOUNTER — OFFICE VISIT CONVERTED (OUTPATIENT)
Dept: ORTHOPEDIC SURGERY | Facility: CLINIC | Age: 51
End: 2021-05-25
Attending: PHYSICIAN ASSISTANT

## 2021-06-03 NOTE — PROCEDURES
Patient: DUANE NINA     Acct: [id number]     Report: #[id number]  MR #:  [id number]     DOS: 2021     : 1970  DICTATING: DAVID MCCARTHY  ***Signed***  --------------------------------------------------------------------------------------------------------------------                              [email or web address]                          [place], [place]  [address]           __________________________________________________________________________         Patient Name:                   Attending Physician:    Duane Nina M.D.         Patient Visit # MR #            Admit Date  Disch Date     Location    [id number]    [id number]      2021                 [facility]- -         Date of Birth    1970    __________________________________________________________________________    0820 - OPERATIVE / PROCEDURE NOTE         DATE OF OPERATION/PROCEDURE:   2021         SURGEON/PHYSICIAN:             DAVID MCCARTHY M.D.         PREOPERATIVE DIAGNOSES:    Right shoulder pain with impingement and primary AC joint arthritis, partial    thickness rotator cuff tear and degenerative labral tearing.         POSTOPERATIVE DIAGNOSES:    Right shoulder pain with impingement and primary AC joint arthritis, partial    thickness rotator cuff tear and degenerative labral tearing.         OPERATIONS PERFORMED:    1.  Right shoulder arthroscopy.    2.  Arthroscopic extensive subacromial decompression.    3.  Arthroscopic distal claviculectomy.    4.  Arthroscopic debridement of degenerative labral tearing.    5.  Arthroscopic debridement of the undersurface of a partial thickness        rotator cuff tear.         ANESTHESIA:    General.         ESTIMATED BLOOD LOSS:    Minimal.         COMPLICATIONS:    None.         INDICATIONS:    Mr. Nina is a 50-year-old with shoulder pain who failed  conservative    treatment. Risks and benefits of surgery were explained. The patient    understands and wishes to proceed.         DESCRIPTION OF PROCEDURE:    The patient was brought to the operating room where he undergoes general    anesthesia. He had a preoperative antibiotic and preoperative block. He was    prepped and draped in sterile fashion. Standard posterior portal was made.    The scope was inserted. There was degenerative labral tearing. Anterior    portal was made and this was then debrided with a shaver. The under surface    of the rotator cuff also showed some partial thickness tearing and this was    debrided using a shaver. Attention was then turned to the subacromial space    where an extensive subacromial decompression was performed using a VAPR,    shaver, and bur, this included acromioplasty, bursectomy, and inspection of    the rotator cuff where no full thickness tears were identified. Attention was    then turned to the distal clavicle where distal claviculectomy was performed    using a VAPR and a bur. The portals were then closed using 3-0 nylon, sterile    dressing was applied, followed by application of icepack and UltraSling.         The patient was then extubated and taken to recovery in stable condition, no    complications.         To be electronically signed in [email or web address]    [id number] DAVID MCCARTHY M.D.         MC:vh    D:  05/12/2021 22:23    T:  05/13/2021 05:04    #[id number]         Until signed, this is an unconfirmed preliminary report that may contain    errors and is subject to change.         Electronically signed by DAVID MCCARTHY  05/13/2021 07:43     Disclaimer: Converted hospital document may not contain table formatting or lab diagrams. Please see [employer] for authenticated document.

## 2021-06-03 NOTE — PROCEDURES
Patient: DUANE TIRADO     Acct: [id number]     Report: #[id number]  MR #:  [id number]     DOS: 2021 1018     : 1970  DICTATING: Brant Kay  ***Signed***  --------------------------------------------------------------------------------------------------------------------  Anesthesia Blck Procedure Note      DATE: 21      Risks and benefits discussed and accepted with patient to include but not     limited to: bleeding, infection, paresthesia, pain at site of injection, limb     weakness, headaches, allergic reactions to injections, and seizures.            The block or continuous infusion is requested by the referring physician for     management of postoperative pain, or pain related to a procedure.      Diagnosis      Post-Op Pain Management      Block:  Single Interscalene      Site/Location:  Right      Prep Solution:  ChloraPrep      Technique:  Ultrasound (Deemed medically necessary)      Needle Type & Size:  Insulated 22 gauge Needle      Solution Injected:  0.5% Ropivacaine, 1:200,000 epi      Local Anesthetic Volume:  30 ml      Complications:  None      Comments:      Sterile technique used.  ID with US, 22Gx1 guided by US. Negative IV test done.     Negative heme with aspiration every 5cc.  No paresthesia with block.  Patient     tolerated well.            Brant Kay                May 12, 2021 10:18      Electronically signed by Brant Kay  2021 10:18     Disclaimer: Converted hospital document may not contain table formatting or lab diagrams. Please see [employer] for authenticated document.

## 2021-06-03 NOTE — PROCEDURES
Patient: DUANE TIRADO     Acct: [id number]     Report: #[id number]  MR #:  [id number]     DOS: 2021 1136     : 1970  DICTATING: DAVID GAFFNEY  ***Signed***  --------------------------------------------------------------------------------------------------------------------  Orthopedic Op/Procedure Note      Date       21            Pre-Operative Diagnosis:      r shoulder pain            Post-Operative Diagnosis:      Same as pre-op diagnosis            Surgeon/Assistants      David Gaffney MD            Anesthesia      General            Procedure Performed/Technique      r ss/sad/dcr/debridement labrum and rtc repair            Specimen/Tissue Removed:      None            Findings:      None            Complications:      No            Estimated Blood Loss:      None            DAVID GAFFNEY                   May 12, 2021 11:36      Electronically signed by DAVID GAFFNEY  2021 11:36     Disclaimer: Converted hospital document may not contain table formatting or lab diagrams. Please see [employer] for authenticated document.

## 2021-06-05 NOTE — PROGRESS NOTES
"   Progress Note      Patient Name: Charles Nina   Patient ID: [id number]   Sex: Male   YOB: 1970    Primary Care Provider: Thor Leal PA-C   Referring Provider: Thor Leal PA-C    Visit Date: May 25, 2021    Provider: Sheyla Junior PA-C   Location: [facility]   Location Address: [address] [place] [address]  [id number]   Location Phone: [phone]          Chief Complaint  · Right Shoulder Arthroscopy      History Of Present Illness  Charles Nina is a 50 year old /White male who presents today to [facility]. Here for 2 wk f/u following right shoulder arthroscopy where subacromial decompression, distal claviculectomy, labral tearing debridement and rotator cuff tear debridement were performed. He states he is doing well, he is very happy with his results and progress so far. Taking ibuprofen before PT, PT 3x/week, PT makes him sore but minimal to no pain otherwise at this point in healing. Wearing sling but using elbow and wrist to prevent stiffness. He works as a  and does heavy lifting at work, he states he want to get back as soon as possible.       Past Medical History  Allergy; Asthma; Carpal tunnel syndrome of right wrist; Foot pain, left; Hyperlipemia; Hyperlipidemia; Left elbow pain; Low testosterone in male; Lumbago/low back pain; Narcolepsy; Neuroma; Plantar fascial fibromatosis of right foot; Radiculopathy, lumbosacral; Reflux Disease; Seasonal allergies; Sinus trouble         Past Surgical History  Arm Surgery; Carpal Tunnel Release; Colonoscopy; Colonoscopy, screening; EGD; Jaw Surgery; Metal implants         Medication List  Allegra Allergy 180 mg oral tablet; Depo-Testosterone 200 mg/mL intramuscular oil; needle (disp) 18 G 18 gauge x 1\" miscellaneous needle; Syringe 3cc/25Mc4-9/2\" 3 mL 21 gauge x 1 1/2\" miscellaneous syringe; Voltaren 1 % topical gel         Allergy List  Neurontin       Allergies Reconciled  Family Medical " History  Diabetes, unspecified type; Diabetes, unspecified type; Rheumatoid arthritis; Prostate cancer; Family history of Arthritis         Social History  Active but no formal exercise; Alcohol (Light); Alcohol Use (Never); lives with children; lives with spouse; ; .; No known infection risk; Recreational Drug Use (Former); Tobacco (Former); Working         Immunizations  Name Date Admin   Tdap 09/12/2016         Review of Systems  · Constitutional  o Denies  o : fever, chills, weight loss  · Cardiovascular  o Denies  o : chest pain, shortness of breath  · Gastrointestinal  o Denies  o : liver disease, heartburn, nausea, blood in stools  · Genitourinary  o Denies  o : painful urination, blood in urine  · Integument  o Denies  o : rash, itching  · Neurologic  o Denies  o : headache, weakness, loss of consciousness  · Musculoskeletal  o Denies  o : painful, swollen joints  · Psychiatric  o Denies  o : drug/alcohol addiction, anxiety, depression      Vitals  Date Time BP Position Site L\R Cuff Size HR RR TEMP (F) WT  HT  BMI kg/m2 BSA m2 O2 Sat FR L/min FiO2        05/25/2021 01:39 PM      60 - R   212lbs 16oz 6'   28.89 2.22 97 %            Physical Examination  · Constitutional  o Appearance  o : well developed, well-nourished, no obvious deformities present  · Head and Face  o Head  o :   § Inspection  § : normocephalic  o Face  o :   § Inspection  § : no facial lesions  · Eyes  o Conjunctivae  o : conjunctivae normal  o Sclerae  o : sclerae white  · Ears, Nose, Mouth and Throat  o Ears  o :   § External Ears  § : appearance within normal limits  § Hearing  § : intact  o Nose  o :   § External Nose  § : appearance normal  · Neck  o Inspection/Palpation  o : normal appearance  o Range of Motion  o : full range of motion  · Respiratory  o Respiratory Effort  o : breathing unlabored  o Inspection of Chest  o : normal appearance  o Auscultation of Lungs  o : no audible wheezing or  rales  · Cardiovascular  o Heart  o : regular rate  · Gastrointestinal  o Abdominal Examination  o : soft and non-tender  · Skin and Subcutaneous Tissue  o General Inspection  o : intact, no rashes  · Psychiatric  o General  o : Alert and oriented x3  o Judgement and Insight  o : judgment and insight intact  o Mood and Affect  o : mood normal, affect appropriate  · Right Shoulder  o Inspection  o : Well healing surgical incisions without erythema, warmth, or drainage. Flexion 120. abduction 110, IR 30, ER 50. Minimal pain with movement. Fully flexes and extends right elbow and wrist.  strength symmetric and WNL BL.           Assessment  · Aftercare following surgery of RIGHT shoulder arthroscopy, subacromial decompression, distal claviculectomy, and debridement of rotator cuff tear and labral tear.     V54.81  · Right shoulder pain, unspecified chronicity     719.41/M25.511      Plan  · Medications  o Medications have been Reconciled  o Transition of Care or Provider Policy  · Instructions  o Reviewed the patient's Past Medical, Social, and Family history as well as the ROS at today's visit, no changes.  o Call or return if worsening symptoms.  o Follow Up in 4 weeks.   o Educated patient on gentle ROM, no push/pulling, and continuing PT. Follow up 4 wks.             Electronically Signed by: Sheyla Junior PA-C -Author on May 25, 2021 04:18:38 PM

## 2021-06-05 NOTE — PROGRESS NOTES
"   Progress Note      Patient Name: Charles Nina   Patient ID: [id number]   Sex: Male   YOB: 1970    Primary Care Provider: Thor Leal PA-C   Referring Provider: Thor Leal PA-C    Visit Date: May 6, 2021    Provider: Teri Gaffney MD   Location: [facility]   Location Address: [address] [place] [address]  [id number]   Location Phone: [phone]          Chief Complaint  · Right Shoulder Pain-MRI Results      History Of Present Illness  Charles Nina is a 50 year old /White male who presents today to [facility].      Patient presents today for a follow-up of right shoulder. Patient has a history of shoulder pain that we have been treating conservatively. He presents today with MRI results of his right shoulder. He has tried injections and medication that has provided mild relief. Patient has been considering surgical intervention if there is an option.       Past Medical History  Allergy; Asthma; Carpal tunnel syndrome of right wrist; Foot pain, left; Hyperlipemia; Hyperlipidemia; Left elbow pain; Low testosterone in male; Lumbago/low back pain; Narcolepsy; Neuroma; Plantar fascial fibromatosis of right foot; Radiculopathy, lumbosacral; Reflux Disease; Seasonal allergies; Sinus trouble         Past Surgical History  Arm Surgery; Carpal Tunnel Release; Colonoscopy; Colonoscopy, screening; EGD; Jaw Surgery; Metal implants         Medication List  Allegra Allergy 180 mg oral tablet; Depo-Testosterone 200 mg/mL intramuscular oil; needle (disp) 18 G 18 gauge x 1\" miscellaneous needle; Syringe 3cc/20Kt7-2/2\" 3 mL 21 gauge x 1 1/2\" miscellaneous syringe; Voltaren 1 % topical gel         Allergy List  Neurontin       Allergies Reconciled  Family Medical History  Diabetes, unspecified type; Diabetes, unspecified type; Rheumatoid arthritis; Prostate cancer; Family history of Arthritis         Social History  Active but no formal exercise; Alcohol (Light); Alcohol Use " (Never); lives with children; lives with spouse; ; .; No known infection risk; Recreational Drug Use (Former); Tobacco (Former); Working         Immunizations  Name Date Admin   Tdap 09/12/2016         Review of Systems  · Constitutional  o Denies  o : fever, chills, weight loss  · Cardiovascular  o Denies  o : chest pain, shortness of breath  · Gastrointestinal  o Denies  o : liver disease, heartburn, nausea, blood in stools  · Genitourinary  o Denies  o : painful urination, blood in urine  · Integument  o Denies  o : rash, itching  · Neurologic  o Denies  o : headache, weakness, loss of consciousness  · Musculoskeletal  o Denies  o : painful, swollen joints  · Psychiatric  o Denies  o : drug/alcohol addiction, anxiety, depression      Vitals  Date Time BP Position Site L\R Cuff Size HR RR TEMP (F) WT  HT  BMI kg/m2 BSA m2 O2 Sat FR L/min FiO2 HC       05/06/2021 08:24 AM      60 - R   212lbs 16oz 6'   28.89 2.22 98 %            Physical Examination  · Constitutional  o Appearance  o : well developed, well-nourished, no obvious deformities present  · Head and Face  o Head  o :   § Inspection  § : normocephalic  o Face  o :   § Inspection  § : no facial lesions  · Eyes  o Conjunctivae  o : conjunctivae normal  o Sclerae  o : sclerae white  · Ears, Nose, Mouth and Throat  o Ears  o :   § External Ears  § : appearance within normal limits  § Hearing  § : intact  o Nose  o :   § External Nose  § : appearance normal  · Neck  o Inspection/Palpation  o : normal appearance  o Range of Motion  o : full range of motion  · Respiratory  o Respiratory Effort  o : breathing unlabored  o Inspection of Chest  o : normal appearance  o Auscultation of Lungs  o : no audible wheezing or rales  · Cardiovascular  o Heart  o : regular rate  · Gastrointestinal  o Abdominal Examination  o : soft and non-tender  · Skin and Subcutaneous Tissue  o General Inspection  o : intact, no rashes  · Psychiatric  o General  o : Alert and  oriented x3  o Judgement and Insight  o : judgment and insight intact  o Mood and Affect  o : mood normal, affect appropriate  · Right Shoulder  o Inspection  o : Sensation grossly intact. Neurovascular intact. Skin intact. Positive Neers. Positive Arreola. Slightly weakened strength. Tender AC joint. Painful cross body adduction. Good tone of deltoid, biceps, triceps, wrist extensors, and wrist flexors. No swelling or skin discoloration. Painful range of motion. Decreased range of motion.   · Imaging  o Imaging  o : 4/29/2021 RIGHT SHOULDER MRI: 1. Supraspinatus tendinopathy with suspected intrasubstance tear and small bursal surface tear at the mid and posterior tendon insertion. 2. Infraspinatus tendinopathy with small anterior intrasubstance tear. 3. Small amount of fluid in the subacromial/subdeltoid bursa which may indicate mild bursitis. 4. Mild glenohumeral osteoarthritis with suspected tears of the superior and anterior-inferior labrum. 5. Mild to moderate acromioclavicular joint degeneration.           Assessment  · AC (acromioclavicular) arthritis     716.91/M19.019  · Right shoulder pain, unspecified chronicity     719.41/M25.511  · Shoulder impingement syndrome, right     726.2/M75.41      Plan  · Medications  o Medications have been Reconciled  o Transition of Care or Provider Policy  · Instructions  o Dr. Gaffney saw and examined the patient and agrees with plan.   o MRI reviewed by Dr. Gaffney.  o Reviewed the patient's Past Medical, Social, and Family history as well as the ROS at today's visit, no changes.  o Call or return if worsening symptoms.  o Discussed surgery.  o Risks/benefits discussed with patient including, but not limited to: infection, bleeding, neurovascular damage, malunion, nonunion, aesthetic deformity, need for further surgery, and death.  o Surgery pamphlet given.  o Follow Up Post-op.  o Discussed operative vs non-operative measures. Patient wishes to proceed with a right shoulder  arthroscopy.   o Electronically Identified Patient Education Materials Provided Electronically            Electronically Signed by: Ammy Hernandez-Maria G Banegas -Author on May 10, 2021 09:47:28 AM  Electronically Co-signed by: Teri Gaffney MD -Reviewer on May 10, 2021 08:21:31 PM

## 2021-06-29 ENCOUNTER — OFFICE VISIT (OUTPATIENT)
Dept: ORTHOPEDIC SURGERY | Facility: CLINIC | Age: 51
End: 2021-06-29

## 2021-06-29 VITALS — OXYGEN SATURATION: 96 % | WEIGHT: 212 LBS | BODY MASS INDEX: 28.71 KG/M2 | HEART RATE: 83 BPM | HEIGHT: 72 IN

## 2021-06-29 DIAGNOSIS — M25.511 CHRONIC RIGHT SHOULDER PAIN: ICD-10-CM

## 2021-06-29 DIAGNOSIS — Z47.89 AFTERCARE FOLLOWING SURGERY OF THE MUSCULOSKELETAL SYSTEM: Primary | ICD-10-CM

## 2021-06-29 DIAGNOSIS — G89.29 CHRONIC RIGHT SHOULDER PAIN: ICD-10-CM

## 2021-06-29 PROCEDURE — 99024 POSTOP FOLLOW-UP VISIT: CPT | Performed by: PHYSICIAN ASSISTANT

## 2021-06-29 RX ORDER — DESVENLAFAXINE 25 MG/1
25 TABLET, EXTENDED RELEASE ORAL DAILY
COMMUNITY
Start: 2021-04-21

## 2021-06-29 RX ORDER — TESTOSTERONE CYPIONATE 200 MG/ML
INJECTION, SOLUTION INTRAMUSCULAR
COMMUNITY
Start: 2021-04-03

## 2021-06-29 RX ORDER — FEXOFENADINE HCL 180 MG/1
TABLET ORAL
COMMUNITY

## 2021-06-29 NOTE — ASSESSMENT & PLAN NOTE
Continue physical therapy.  Follow-up in 6 weeks for recheck.  Avoid heavy lifting, pushing and pulling.

## 2021-06-29 NOTE — PROGRESS NOTES
"Chief Complaint  Pain of the Right Shoulder    Subjective          Charles Nina presents to [facility] for 6-week postop follow-up right shoulder arthroscopy with SCD, DCR, labral tearing debridement and rotator cuff tear debridement.  Patient's pain is minimal, states he is very happy with his progress.  Plans to do PT and they are about to start working on strengthening.    Objective   Vital Signs:   Pulse 83   Ht 182.9 cm (72\")   Wt 96.2 kg (212 lb)   SpO2 96%   BMI 28.75 kg/m²       Physical Exam  Constitutional:       Appearance: Normal appearance. He is well-developed and normal weight.   HENT:      Head: Normocephalic.      Right Ear: Hearing and external ear normal.      Left Ear: Hearing and external ear normal.      Nose: Nose normal.   Eyes:      Conjunctiva/sclera: Conjunctivae normal.   Cardiovascular:      Rate and Rhythm: Normal rate.   Pulmonary:      Effort: Pulmonary effort is normal.      Breath sounds: No wheezing or rales.   Abdominal:      Palpations: Abdomen is soft.      Tenderness: There is no abdominal tenderness.   Musculoskeletal:      Cervical back: Normal range of motion.   Skin:     Findings: No rash.   Neurological:      Mental Status: He is alert and oriented to person, place, and time.   Psychiatric:         Mood and Affect: Mood and affect normal.         Judgment: Judgment normal.     Ortho Exam  Right shoulder: Well-healed surgical incisions without erythema dehiscence or purulent drainage.  Active flexion to 175, abduction to 170, internal rotation to L1.  Radial pulses 2+ bilaterally, sensation to light touch intact in bilateral upper extremities.  There is no tenderness to palpation about the shoulder and no obvious swelling.  Full range of motion right elbow wrist and digits on right lower extremity.  Result Review :            Imaging Results (Most Recent)     None                Assessment and Plan    Problem List Items Addressed This " Visit        Musculoskeletal and Injuries    Aftercare following surgery of the musculoskeletal system - Primary    Current Assessment & Plan     Continue physical therapy.  Follow-up in 6 weeks for recheck.  Avoid heavy lifting, pushing and pulling.         Relevant Orders    Ambulatory Referral to Physical Therapy Evaluate and treat, POST OP; Stretching, ROM, Strengthening    Chronic right shoulder pain          Follow Up   Return in about 6 weeks (around 8/10/2021) for Recheck.  Patient Instructions   Continue physical therapy.  Follow-up in 6 weeks for recheck.  Avoid heavy lifting, pushing and pulling.    Patient was given instructions and counseling regarding his condition or for health maintenance advice. Please see specific information pulled into the AVS if appropriate.

## 2021-07-15 VITALS — WEIGHT: 213 LBS | HEIGHT: 72 IN | BODY MASS INDEX: 28.85 KG/M2 | OXYGEN SATURATION: 97 % | HEART RATE: 60 BPM

## 2021-07-15 VITALS — HEIGHT: 72 IN | OXYGEN SATURATION: 98 % | WEIGHT: 213 LBS | BODY MASS INDEX: 28.85 KG/M2 | HEART RATE: 60 BPM

## 2021-08-10 ENCOUNTER — OFFICE VISIT (OUTPATIENT)
Dept: ORTHOPEDIC SURGERY | Facility: CLINIC | Age: 51
End: 2021-08-10

## 2021-08-10 VITALS — BODY MASS INDEX: 29.39 KG/M2 | OXYGEN SATURATION: 96 % | HEIGHT: 72 IN | WEIGHT: 217 LBS | HEART RATE: 72 BPM

## 2021-08-10 DIAGNOSIS — Z47.89 AFTERCARE FOLLOWING SURGERY OF THE MUSCULOSKELETAL SYSTEM: Primary | ICD-10-CM

## 2021-08-10 PROCEDURE — 99212 OFFICE O/P EST SF 10 MIN: CPT | Performed by: PHYSICIAN ASSISTANT

## 2021-08-10 NOTE — PATIENT INSTRUCTIONS
Patient given work note to return to work.   He is to increase activity gradually as tolerable. Continue with HEP consisting of strengthening exercises.  Call with any changes or concerns.

## 2021-08-10 NOTE — PROGRESS NOTES
"Chief Complaint  Pain of the Right Shoulder    Subjective          Charles Nina presents to [facility] for s/p right shoulder arthroscopy with KELSEY MONTES and labral and RC debridement on 05/12/21 by Dr. Gaffney. Patient states he has some pain still, mostly top of ROM, otherwise he has no complaints at this time. He has completed 27 PT visits. His therapist notes improvement of patients ROM and strength. Patient is a  and states he feels ready to return to work.     Objective   Vital Signs:   Pulse 72   Ht 182.9 cm (72\")   Wt 98.4 kg (217 lb)   SpO2 96%   BMI 29.43 kg/m²       Physical Exam  Constitutional:       Appearance: Normal appearance. He is well-developed and normal weight.   HENT:      Head: Normocephalic.      Right Ear: Hearing and external ear normal.      Left Ear: Hearing and external ear normal.      Nose: Nose normal.   Eyes:      Conjunctiva/sclera: Conjunctivae normal.   Cardiovascular:      Rate and Rhythm: Normal rate.   Pulmonary:      Effort: Pulmonary effort is normal.      Breath sounds: No wheezing or rales.   Abdominal:      Palpations: Abdomen is soft.      Tenderness: There is no abdominal tenderness.   Musculoskeletal:      Cervical back: Normal range of motion.   Skin:     Findings: No rash.   Neurological:      Mental Status: He is alert and oriented to person, place, and time.   Psychiatric:         Mood and Affect: Mood and affect normal.         Judgment: Judgment normal.     Ortho Exam  Right shoulder: Well-healed scars.  No tenderness, swelling, atrophy or discoloration.  Full active range of motion with flexion, extension, abduction and rotation.  Sensation intact.  Neurovascular intact.  Radial and ulnar pulse are 2+.  Good muscle tone of the deltoid, biceps and triceps muscles.  Full active range of motion of the elbow and the wrist.  4/5 muscle strength in all planes.    Result Review :   The following data was reviewed by: Tammy " AMY Barney on 08/10/2021:         Imaging Results (Most Recent)     None                Assessment and Plan    Problem List Items Addressed This Visit        Musculoskeletal and Injuries    Aftercare following surgery of the musculoskeletal system - Primary          Follow Up   Return if symptoms worsen or fail to improve.  Patient Instructions   Patient given work note to return to work.   He is to increase activity gradually as tolerable. Continue with HEP consisting of strengthening exercises.  Call with any changes or concerns.     Patient was given instructions and counseling regarding his condition or for health maintenance advice. Please see specific information pulled into the AVS if appropriate.

## 2021-10-28 ENCOUNTER — FLU SHOT (OUTPATIENT)
Dept: VACCINE CLINIC | Facility: HOSPITAL | Age: 51
End: 2021-10-28

## 2021-10-28 DIAGNOSIS — Z23 NEED FOR INFLUENZA VACCINATION: Primary | ICD-10-CM

## 2025-01-21 ENCOUNTER — APPOINTMENT (OUTPATIENT)
Dept: CT IMAGING | Facility: HOSPITAL | Age: 55
End: 2025-01-21
Payer: COMMERCIAL

## 2025-01-21 ENCOUNTER — HOSPITAL ENCOUNTER (EMERGENCY)
Facility: HOSPITAL | Age: 55
Discharge: HOME | End: 2025-01-21
Attending: EMERGENCY MEDICINE | Admitting: EMERGENCY MEDICINE
Payer: COMMERCIAL

## 2025-01-21 VITALS
BODY MASS INDEX: 30.34 KG/M2 | RESPIRATION RATE: 18 BRPM | TEMPERATURE: 98.6 F | WEIGHT: 223.99 LBS | SYSTOLIC BLOOD PRESSURE: 140 MMHG | OXYGEN SATURATION: 94 % | DIASTOLIC BLOOD PRESSURE: 90 MMHG | HEIGHT: 72 IN | HEART RATE: 75 BPM

## 2025-01-21 DIAGNOSIS — S76.811A STRAIN OF RIGHT ILIOPSOAS MUSCLE, INITIAL ENCOUNTER: ICD-10-CM

## 2025-01-21 DIAGNOSIS — M87.052 AVASCULAR NECROSIS OF LEFT FEMORAL HEAD: ICD-10-CM

## 2025-01-21 DIAGNOSIS — M70.71 ILIOPSOAS BURSITIS OF RIGHT HIP: Primary | ICD-10-CM

## 2025-01-21 LAB
ALBUMIN SERPL-MCNC: 4.5 G/DL (ref 3.5–5.2)
ALBUMIN/GLOB SERPL: 1.7 G/DL
ALP SERPL-CCNC: 69 U/L (ref 39–117)
ALT SERPL W P-5'-P-CCNC: 50 U/L (ref 1–41)
ANION GAP SERPL CALCULATED.3IONS-SCNC: 11.2 MMOL/L (ref 5–15)
AST SERPL-CCNC: 29 U/L (ref 1–40)
BASOPHILS # BLD AUTO: 0.06 10*3/MM3 (ref 0–0.2)
BASOPHILS NFR BLD AUTO: 0.9 % (ref 0–1.5)
BILIRUB SERPL-MCNC: 0.6 MG/DL (ref 0–1.2)
BILIRUB UR QL STRIP: NEGATIVE
BUN SERPL-MCNC: 12 MG/DL (ref 6–20)
BUN/CREAT SERPL: 15.4 (ref 7–25)
CALCIUM SPEC-SCNC: 9 MG/DL (ref 8.6–10.5)
CHLORIDE SERPL-SCNC: 102 MMOL/L (ref 98–107)
CLARITY UR: CLEAR
CO2 SERPL-SCNC: 24.8 MMOL/L (ref 22–29)
COLOR UR: YELLOW
CREAT SERPL-MCNC: 0.78 MG/DL (ref 0.76–1.27)
DEPRECATED RDW RBC AUTO: 42.2 FL (ref 37–54)
EGFRCR SERPLBLD CKD-EPI 2021: 106 ML/MIN/1.73
EOSINOPHIL # BLD AUTO: 0.11 10*3/MM3 (ref 0–0.4)
EOSINOPHIL NFR BLD AUTO: 1.6 % (ref 0.3–6.2)
ERYTHROCYTE [DISTWIDTH] IN BLOOD BY AUTOMATED COUNT: 12.8 % (ref 12.3–15.4)
GLOBULIN UR ELPH-MCNC: 2.6 GM/DL
GLUCOSE SERPL-MCNC: 98 MG/DL (ref 65–99)
GLUCOSE UR STRIP-MCNC: NEGATIVE MG/DL
HCT VFR BLD AUTO: 44.1 % (ref 37.5–51)
HGB BLD-MCNC: 15 G/DL (ref 13–17.7)
HGB UR QL STRIP.AUTO: NEGATIVE
HOLD SPECIMEN: NORMAL
IMM GRANULOCYTES # BLD AUTO: 0.02 10*3/MM3 (ref 0–0.05)
IMM GRANULOCYTES NFR BLD AUTO: 0.3 % (ref 0–0.5)
KETONES UR QL STRIP: NEGATIVE
LEUKOCYTE ESTERASE UR QL STRIP.AUTO: NEGATIVE
LIPASE SERPL-CCNC: 21 U/L (ref 13–60)
LYMPHOCYTES # BLD AUTO: 1.61 10*3/MM3 (ref 0.7–3.1)
LYMPHOCYTES NFR BLD AUTO: 23.6 % (ref 19.6–45.3)
MCH RBC QN AUTO: 30.7 PG (ref 26.6–33)
MCHC RBC AUTO-ENTMCNC: 34 G/DL (ref 31.5–35.7)
MCV RBC AUTO: 90.4 FL (ref 79–97)
MONOCYTES # BLD AUTO: 0.46 10*3/MM3 (ref 0.1–0.9)
MONOCYTES NFR BLD AUTO: 6.8 % (ref 5–12)
NEUTROPHILS NFR BLD AUTO: 4.55 10*3/MM3 (ref 1.7–7)
NEUTROPHILS NFR BLD AUTO: 66.8 % (ref 42.7–76)
NITRITE UR QL STRIP: NEGATIVE
NRBC BLD AUTO-RTO: 0 /100 WBC (ref 0–0.2)
PH UR STRIP.AUTO: 7 [PH] (ref 5–8)
PLATELET # BLD AUTO: 192 10*3/MM3 (ref 140–450)
PMV BLD AUTO: 10.2 FL (ref 6–12)
POTASSIUM SERPL-SCNC: 4.1 MMOL/L (ref 3.5–5.2)
PROT SERPL-MCNC: 7.1 G/DL (ref 6–8.5)
PROT UR QL STRIP: NEGATIVE
RBC # BLD AUTO: 4.88 10*6/MM3 (ref 4.14–5.8)
SODIUM SERPL-SCNC: 138 MMOL/L (ref 136–145)
SP GR UR STRIP: >1.03 (ref 1–1.03)
UROBILINOGEN UR QL STRIP: ABNORMAL
WBC NRBC COR # BLD AUTO: 6.81 10*3/MM3 (ref 3.4–10.8)

## 2025-01-21 PROCEDURE — 83690 ASSAY OF LIPASE: CPT | Performed by: EMERGENCY MEDICINE

## 2025-01-21 PROCEDURE — 85025 COMPLETE CBC W/AUTO DIFF WBC: CPT | Performed by: EMERGENCY MEDICINE

## 2025-01-21 PROCEDURE — 25510000001 IOPAMIDOL PER 1 ML: Performed by: EMERGENCY MEDICINE

## 2025-01-21 PROCEDURE — 25810000003 LACTATED RINGERS SOLUTION: Performed by: EMERGENCY MEDICINE

## 2025-01-21 PROCEDURE — 99285 EMERGENCY DEPT VISIT HI MDM: CPT

## 2025-01-21 PROCEDURE — 74177 CT ABD & PELVIS W/CONTRAST: CPT

## 2025-01-21 PROCEDURE — 80053 COMPREHEN METABOLIC PANEL: CPT | Performed by: EMERGENCY MEDICINE

## 2025-01-21 PROCEDURE — 87086 URINE CULTURE/COLONY COUNT: CPT | Performed by: EMERGENCY MEDICINE

## 2025-01-21 PROCEDURE — 81003 URINALYSIS AUTO W/O SCOPE: CPT | Performed by: EMERGENCY MEDICINE

## 2025-01-21 RX ORDER — IOPAMIDOL 755 MG/ML
100 INJECTION, SOLUTION INTRAVASCULAR
Status: COMPLETED | OUTPATIENT
Start: 2025-01-21 | End: 2025-01-21

## 2025-01-21 RX ORDER — LAMOTRIGINE 100 MG/1
100 TABLET ORAL
COMMUNITY
Start: 2024-11-26

## 2025-01-21 RX ORDER — CYCLOBENZAPRINE HCL 10 MG
10 TABLET ORAL 3 TIMES DAILY PRN
Qty: 15 TABLET | Refills: 0 | Status: SHIPPED | OUTPATIENT
Start: 2025-01-21

## 2025-01-21 RX ORDER — ESOMEPRAZOLE MAGNESIUM 40 MG/1
40 CAPSULE, DELAYED RELEASE ORAL DAILY
COMMUNITY

## 2025-01-21 RX ORDER — DIFLUNISAL 500 MG/1
500 TABLET, FILM COATED ORAL 2 TIMES DAILY PRN
Qty: 20 TABLET | Refills: 0 | Status: SHIPPED | OUTPATIENT
Start: 2025-01-21

## 2025-01-21 RX ADMIN — IOPAMIDOL 100 ML: 755 INJECTION, SOLUTION INTRAVENOUS at 15:16

## 2025-01-21 RX ADMIN — SODIUM CHLORIDE, POTASSIUM CHLORIDE, SODIUM LACTATE AND CALCIUM CHLORIDE 500 ML: 600; 310; 30; 20 INJECTION, SOLUTION INTRAVENOUS at 14:30

## 2025-01-21 NOTE — ED NOTES
Pt states he fell on ice 2 weeks ago and has been having right groin pain, pt states he has been careful not to lift etc since R/T pain.   Last evening he and his daughter were wrestling and he felt a sharp pain in the right groin that travel upto the stomach and down his right leg

## 2025-01-21 NOTE — ED PROVIDER NOTES
Time: 1:48 PM EST  Date of encounter:  1/21/2025  Independent Historian/Clinical History and Information was obtained by:   Patient    History is limited by: N/A    Chief Complaint: right groin pain      History of Present Illness:  Patient is a 54 y.o. year old male who presents to the emergency department for evaluation of right groin pain.  The patient had a straining injury to right groin recently and then was wrestling with family member and re injured area.  The patient denies direct trauma and he has had some pain to the testicles; however, he has no testicular or genital pain now.  No difficulty urinating or abdominal pain or injury.  No numbness or weakness      Patient Care Team  Primary Care Provider: Conchis Chung APRN    Past Medical History:     Allergies   Allergen Reactions    Gabapentin Nausea And Vomiting     Past Medical History:   Diagnosis Date    Acid reflux disease     Allergy     Asthma     Carpal tunnel syndrome of right wrist 02/27/2015    Foot pain, left     Hyperlipemia     Hyperlipidemia 04/02/2019    Left elbow pain     Low back pain     Low testosterone in male 05/16/2019    Narcolepsy 02/27/2015    Neuroma 10/15/2018    Plantar fascial fibromatosis of right foot 10/15/2018    Radiculopathy, lumbosacral region 06/17/2016    Seasonal allergies 02/27/2015    Sinus trouble      Past Surgical History:   Procedure Laterality Date    APPLY EXTERNAL FIXATION TO ARM Left     CARPAL TUNNEL RELEASE Right 06/2015    Dr. Monroy    COLONOSCOPY      COLONOSCOPY  03/24/2016    Screening - EGD/COLON: Hiatal Hernia/Colon Polyps(Dr. Gamez)     ENDOSCOPY  03/24/2016    Hiatal Hernia (Dr. Gamez)    MANDIBLE FRACTURE SURGERY      MANDIBLE FRACTURE SURGERY  1991    OTHER SURGICAL HISTORY      Metal Implants    SHOULDER SURGERY       Family History   Problem Relation Age of Onset    Diabetes Father     Cancer Father     Cancer Maternal Aunt     Cancer Maternal Uncle     Diabetes Maternal Grandmother      Diabetes Paternal Grandmother     Cancer Paternal Grandmother        Home Medications:  Prior to Admission medications    Medication Sig Start Date End Date Taking? Authorizing Provider   lamoTRIgine (LaMICtal) 100 MG tablet 1 tablet. 24  Yes Shane Cyr MD   desvenlafaxine (PRISTIQ) 100 MG 24 hr tablet Take 1 tablet by mouth Daily. 24   Shane Cyr MD   Desvenlafaxine Succinate ER 25 MG tablet sustained-release 24 hour Take 1 tablet by mouth Daily. 21   Shane Cyr MD   esomeprazole (nexIUM) 40 MG capsule Take 1 capsule by mouth Daily.    Shane Cyr MD   fexofenadine (Allegra Allergy) 180 MG tablet Allegra Allergy 180 mg oral tablet take 1 tablet (180 mg) by oral route once daily   Active    Shane Cyr MD   fluticasone (FLONASE) 50 MCG/ACT nasal spray Administer 2 sprays into the nostril(s) as directed by provider Daily. 24   Colleen Posey APRN   Testosterone Cypionate (DEPOTESTOTERONE CYPIONATE) 200 MG/ML injection INJECT 0.5ML INTRAMUSCULARILY WEEKLY 4/3/21   Shane Cyr MD        Social History:   Social History     Tobacco Use    Smoking status: Former     Current packs/day: 0.00     Types: Cigarettes     Quit date:      Years since quittin.0    Smokeless tobacco: Former   Vaping Use    Vaping status: Some Days    Substances: THC, Flavoring   Substance Use Topics    Alcohol use: Yes    Drug use: No         Review of Systems:  Review of Systems   Constitutional:  Negative for chills and fever.   HENT:  Negative for congestion, ear pain and sore throat.    Eyes:  Negative for pain.   Respiratory:  Negative for cough, chest tightness and shortness of breath.    Cardiovascular:  Negative for chest pain.   Gastrointestinal:  Negative for abdominal pain, diarrhea, nausea and vomiting.   Genitourinary:  Negative for difficulty urinating, dysuria, enuresis, flank pain and hematuria.   Musculoskeletal:  Negative for  "joint swelling.        Right groin pain in the inguinal region   Skin:  Negative for pallor.   Neurological:  Negative for seizures and headaches.   All other systems reviewed and are negative.       Physical Exam:  /90   Pulse 75   Temp 98.6 °F (37 °C) (Oral)   Resp 18   Ht 182.9 cm (72\")   Wt 102 kg (223 lb 15.8 oz)   SpO2 94%   BMI 30.38 kg/m²     Physical Exam  Vitals and nursing note reviewed.   Constitutional:       General: He is not in acute distress.     Appearance: Normal appearance. He is not toxic-appearing.   HENT:      Head: Normocephalic and atraumatic.      Mouth/Throat:      Mouth: Mucous membranes are moist.   Eyes:      General: No scleral icterus.  Cardiovascular:      Rate and Rhythm: Normal rate and regular rhythm.      Pulses: Normal pulses.      Heart sounds: Normal heart sounds.   Pulmonary:      Effort: Pulmonary effort is normal. No respiratory distress.      Breath sounds: Normal breath sounds.   Abdominal:      General: Abdomen is flat.      Palpations: Abdomen is soft.      Tenderness: There is abdominal tenderness.      Comments: Tenderness just above right inguinal ligament   Genitourinary:     Penis: Normal.       Testes: Normal.      Comments: No testicular edema or tenderness no sign of inguinal or other hernia.  Tenderness in right inguinal ligament area and just above inguinal ligament  Musculoskeletal:         General: Normal range of motion.      Cervical back: Normal range of motion and neck supple.   Skin:     General: Skin is warm and dry.   Neurological:      Mental Status: He is alert and oriented to person, place, and time. Mental status is at baseline.                    Medical Decision Making:      Comorbidities that affect care:    Hypertension    External Notes reviewed:    Previous Clinic Note: for sinusitis      The following orders were placed and all results were independently analyzed by me:  Orders Placed This Encounter   Procedures    Urine " Culture - Urine,    CT Abdomen Pelvis With Contrast    Comprehensive Metabolic Panel    Urinalysis With Culture If Indicated -    Lipase    CBC Auto Differential    CBC & Differential    Extra Tubes    Gold Top - SST       Medications Given in the Emergency Department:  Medications   lactated ringers bolus 500 mL (0 mL Intravenous Stopped 1/21/25 1515)   iopamidol (ISOVUE-370) 76 % injection 100 mL (100 mL Intravenous Given 1/21/25 1516)        ED Course:         Labs:    Lab Results (last 24 hours)       Procedure Component Value Units Date/Time    CBC & Differential [[id number]]  (Normal) Collected: 01/21/25 1429    Specimen: Blood Updated: 01/21/25 1440    Narrative:      The following orders were created for panel order CBC & Differential.  Procedure                               Abnormality         Status                     ---------                               -----------         ------                     CBC Auto Differential[[id number]]        Normal              Final result                 Please view results for these tests on the individual orders.    Comprehensive Metabolic Panel [[id number]]  (Abnormal) Collected: 01/21/25 1429    Specimen: Blood Updated: 01/21/25 1502     Glucose 98 mg/dL      BUN 12 mg/dL      Creatinine 0.78 mg/dL      Sodium 138 mmol/L      Potassium 4.1 mmol/L      Chloride 102 mmol/L      CO2 24.8 mmol/L      Calcium 9.0 mg/dL      Total Protein 7.1 g/dL      Albumin 4.5 g/dL      ALT (SGPT) 50 U/L      AST (SGOT) 29 U/L      Alkaline Phosphatase 69 U/L      Total Bilirubin 0.6 mg/dL      Globulin 2.6 gm/dL      A/G Ratio 1.7 g/dL      BUN/Creatinine Ratio 15.4     Anion Gap 11.2 mmol/L      eGFR 106.0 mL/min/1.73     Narrative:      GFR Categories in Chronic Kidney Disease (CKD)      GFR Category          GFR (mL/min/1.73)    Interpretation  G1                     90 or greater         Normal or high (1)  G2                      60-89                Mild decrease (1)  G3a                    45-59                Mild to moderate decrease  G3b                   30-44                Moderate to severe decrease  G4                    15-29                Severe decrease  G5                    14 or less           Kidney failure          (1)In the absence of evidence of kidney disease, neither GFR category G1 or G2 fulfill the criteria for CKD.    eGFR calculation 2021 CKD-EPI creatinine equation, which does not include race as a factor    Lipase [[id number]]  (Normal) Collected: 01/21/25 1429    Specimen: Blood Updated: 01/21/25 1502     Lipase 21 U/L     CBC Auto Differential [[id number]]  (Normal) Collected: 01/21/25 1429    Specimen: Blood Updated: 01/21/25 1440     WBC 6.81 10*3/mm3      RBC 4.88 10*6/mm3      Hemoglobin 15.0 g/dL      Hematocrit 44.1 %      MCV 90.4 fL      MCH 30.7 pg      MCHC 34.0 g/dL      RDW 12.8 %      RDW-SD 42.2 fl      MPV 10.2 fL      Platelets 192 10*3/mm3      Neutrophil % 66.8 %      Lymphocyte % 23.6 %      Monocyte % 6.8 %      Eosinophil % 1.6 %      Basophil % 0.9 %      Immature Grans % 0.3 %      Neutrophils, Absolute 4.55 10*3/mm3      Lymphocytes, Absolute 1.61 10*3/mm3      Monocytes, Absolute 0.46 10*3/mm3      Eosinophils, Absolute 0.11 10*3/mm3      Basophils, Absolute 0.06 10*3/mm3      Immature Grans, Absolute 0.02 10*3/mm3      nRBC 0.0 /100 WBC     Urinalysis With Culture If Indicated - Urine, Clean Catch [[id number]]  (Abnormal) Collected: 01/21/25 1709    Specimen: Urine, Clean Catch Updated: 01/21/25 1719     Color, UA Yellow     Appearance, UA Clear     pH, UA 7.0     Specific Gravity, UA >1.030     Glucose, UA Negative     Ketones, UA Negative     Bilirubin, UA Negative     Blood, UA Negative     Protein, UA Negative     Leuk Esterase, UA Negative     Nitrite, UA Negative     Urobilinogen, UA 0.2 E.U./dL    Narrative:      In absence of clinical symptoms, the presence of pyuria, bacteria, and/or nitrites on the urinalysis result does not  correlate with infection.  Urine microscopic not indicated.    Urine Culture - Urine, Urine, Clean Catch [[id number]] Collected: 01/21/25 1709    Specimen: Urine, Clean Catch Updated: 01/21/25 1719             Imaging:    CT Abdomen Pelvis With Contrast    Result Date: 1/21/2025  CT ABDOMEN PELVIS W CONTRAST Date of Exam: 1/21/2025 3:10 PM EST Indication: Right groin pain. Comparison: CT abdomen pelvis with contrast dated 6/11/2020 Technique: Axial CT images were obtained of the abdomen and pelvis after the uneventful intravenous administration of iodinated contrast. Reconstructed coronal and sagittal images were also obtained. Automated exposure control and iterative construction methods were used. Findings: The lung bases are clear bilaterally. The liver demonstrates diffusely decreased echogenicity consistent with steatosis. The gallbladder, spleen, pancreas and adrenal glands appear unremarkable. The kidneys appear normal bilaterally. No adenopathy or free fluid is seen in the abdomen or pelvis. No hernia is evident. There is mild distention of the right iliopsoas bursa with fluid. The urinary bladder appears normal. No ureteral stone is seen on either side. The urinary bladder, prostate and seminal vesicles appear normal. A moderate amount of stool is noted in the rectum no acute bowel abnormality is identified. The appendix appears normal. In the superior left femoral head is a 2.5 cm x 1.6 cm sclerotic lesion consistent with avascular necrosis, unchanged in the interval. No destructive osseous lesion is seen.     Impression: 1.Diffuse hepatic steatosis. 2.Distention of the right iliopsoas bursa with fluid suggesting bursitis. 3.Stable avascular necrosis in the superior left femoral head. 4.Moderate amount of stool in the rectum. Correlate clinically for constipation. Electronically Signed: Timothy Jaimes MD  1/21/2025 3:39 PM EST  Workstation ID: [id number]       Differential Diagnosis and  Discussion:    Abdominal Pain: Based on the patient's signs and symptoms, I considered abdominal aortic aneurysm, small bowel obstruction, pancreatitis, acute cholecystitis, acute appendecitis, peptic ulcer disease, gastritis, colitis, endocrine disorders, irritable bowel syndrome and other differential diagnosis an etiology of the patient's abdominal pain.    PROCEDURES:    Labs were collected in the emergency department and all labs were reviewed and interpreted by me.  X-ray were performed in the emergency department and all X-ray impressions were independently interpreted by me.    No orders to display       Procedures    MDM                     Patient Care Considerations:    MRI: I considered ordering an MRI however the patient has no new neurologic symptoms      Consultants/Shared Management Plan:    None    Social Determinants of Health:    Patient is independent, reliable, and has access to care.       Disposition and Care Coordination:    Discharged: I considered escalation of care by admitting this patient to the hospital, however the patient has no sign of emergent illness requiring admission    I have explained discharge medications and the need for follow up with the patient/caretakers. This was also printed in the discharge instructions. Patient was discharged with the following medications and follow up:      Medication List        New Prescriptions      cyclobenzaprine 10 MG tablet  Commonly known as: FLEXERIL  Take 1 tablet by mouth 3 (Three) Times a Day As Needed (Muscle pain or spasm).     diflunisal 500 MG tablet tablet  Commonly known as: DOLOBID  Take 1 tablet by mouth 2 (Two) Times a Day As Needed (Pain).               Where to Get Your Medications        These medications were sent to [facility] - [place] - [address] - [phone]  - [phone] FX  [address], [place] [address]      Phone: [phone]   cyclobenzaprine 10 MG tablet  diflunisal 500 MG  tablet tablet      Jaguar Olivares MD  [address]  [place] [address]  [phone]    In 1 day  Call for appointment regarding the finding of avascular necrosis of your left femoral head    [facility]  [facility] [place] [address]  [phone]    Follow-up regarding your iliopsoas strain and bursitis       Final diagnoses:   Iliopsoas bursitis of right hip   Strain of right iliopsoas muscle, initial encounter   Avascular necrosis of left femoral head        ED Disposition       ED Disposition   Discharge    Condition   Stable    Comment   --               This medical record created using voice recognition software.             Jovanni Bass, DO  01/22/25 1135

## 2025-01-21 NOTE — DISCHARGE INSTRUCTIONS
Take medication as directed.  Follow-up with orthopedics and physical therapy as directed.  Return for worsening symptoms.

## 2025-01-23 LAB — BACTERIA SPEC AEROBE CULT: NO GROWTH

## 2025-04-08 ENCOUNTER — TELEPHONE (OUTPATIENT)
Dept: GASTROENTEROLOGY | Facility: CLINIC | Age: 55
End: 2025-04-08
Payer: COMMERCIAL

## 2025-04-08 NOTE — TELEPHONE ENCOUNTER
Contacted patient about the referral from Conchis Chung for Dysphagia  Colon cancer screening patient is established with Dr Roberson's office. Due to patient work scheduled he needed an afternoon appointment for the phone call. Scheduled for for the screening call 06/05/2025 at 2:00 pm.

## 2025-06-05 ENCOUNTER — TELEPHONE (OUTPATIENT)
Dept: GASTROENTEROLOGY | Facility: CLINIC | Age: 55
End: 2025-06-05
Payer: COMMERCIAL

## 2025-08-28 ENCOUNTER — PREP FOR SURGERY (OUTPATIENT)
Dept: OTHER | Facility: HOSPITAL | Age: 55
End: 2025-08-28
Payer: COMMERCIAL

## 2025-08-28 ENCOUNTER — CLINICAL SUPPORT (OUTPATIENT)
Dept: GASTROENTEROLOGY | Facility: CLINIC | Age: 55
End: 2025-08-28
Payer: COMMERCIAL

## 2025-08-28 DIAGNOSIS — K21.9 GASTROESOPHAGEAL REFLUX DISEASE, UNSPECIFIED WHETHER ESOPHAGITIS PRESENT: Primary | ICD-10-CM

## 2025-08-28 RX ORDER — NALTREXONE HYDROCHLORIDE 50 MG/1
TABLET, FILM COATED ORAL
COMMUNITY
Start: 2025-08-17

## 2025-08-29 ENCOUNTER — TELEPHONE (OUTPATIENT)
Dept: GASTROENTEROLOGY | Facility: CLINIC | Age: 55
End: 2025-08-29
Payer: COMMERCIAL

## 2025-08-29 ENCOUNTER — PREP FOR SURGERY (OUTPATIENT)
Dept: OTHER | Facility: HOSPITAL | Age: 55
End: 2025-08-29
Payer: COMMERCIAL